# Patient Record
Sex: MALE | Race: WHITE | Employment: UNEMPLOYED | URBAN - METROPOLITAN AREA
[De-identification: names, ages, dates, MRNs, and addresses within clinical notes are randomized per-mention and may not be internally consistent; named-entity substitution may affect disease eponyms.]

---

## 2020-07-22 ENCOUNTER — APPOINTMENT (OUTPATIENT)
Dept: GENERAL RADIOLOGY | Age: 20
End: 2020-07-22
Payer: COMMERCIAL

## 2020-07-22 ENCOUNTER — HOSPITAL ENCOUNTER (EMERGENCY)
Age: 20
Discharge: HOME | End: 2020-07-22
Payer: COMMERCIAL

## 2020-07-22 VITALS
HEIGHT: 65 IN | TEMPERATURE: 98.1 F | OXYGEN SATURATION: 100 % | BODY MASS INDEX: 21.16 KG/M2 | HEART RATE: 71 BPM | RESPIRATION RATE: 18 BRPM | DIASTOLIC BLOOD PRESSURE: 96 MMHG | SYSTOLIC BLOOD PRESSURE: 132 MMHG | WEIGHT: 127 LBS

## 2020-07-22 LAB — STREP GRP A PCR: NEGATIVE

## 2020-07-22 PROCEDURE — 99285 EMERGENCY DEPT VISIT HI MDM: CPT

## 2020-07-22 PROCEDURE — 6370000000 HC RX 637 (ALT 250 FOR IP): Performed by: NURSE PRACTITIONER

## 2020-07-22 PROCEDURE — 71046 X-RAY EXAM CHEST 2 VIEWS: CPT

## 2020-07-22 PROCEDURE — 87651 STREP A DNA AMP PROBE: CPT

## 2020-07-22 RX ORDER — IBUPROFEN 800 MG/1
800 TABLET ORAL ONCE
Status: COMPLETED | OUTPATIENT
Start: 2020-07-22 | End: 2020-07-22

## 2020-07-22 RX ORDER — NAPROXEN 500 MG/1
500 TABLET ORAL 2 TIMES DAILY
Qty: 20 TABLET | Refills: 0 | Status: SHIPPED | OUTPATIENT
Start: 2020-07-22 | End: 2022-03-29

## 2020-07-22 RX ORDER — CYCLOBENZAPRINE HCL 10 MG
10 TABLET ORAL 3 TIMES DAILY PRN
Qty: 15 TABLET | Refills: 0 | Status: SHIPPED | OUTPATIENT
Start: 2020-07-22 | End: 2020-08-01

## 2020-07-22 RX ADMIN — IBUPROFEN 800 MG: 800 TABLET, FILM COATED ORAL at 11:01

## 2020-07-22 ASSESSMENT — ENCOUNTER SYMPTOMS
TROUBLE SWALLOWING: 0
NAUSEA: 0
DIARRHEA: 0
BACK PAIN: 0
SORE THROAT: 0
CHEST TIGHTNESS: 0
SHORTNESS OF BREATH: 0
COUGH: 0
VOMITING: 0
WHEEZING: 0
ABDOMINAL PAIN: 0

## 2020-07-22 ASSESSMENT — PAIN SCALES - GENERAL: PAINLEVEL_OUTOF10: 4

## 2020-07-22 ASSESSMENT — PAIN DESCRIPTION - LOCATION: LOCATION: THROAT

## 2020-07-22 ASSESSMENT — PAIN DESCRIPTION - FREQUENCY: FREQUENCY: INTERMITTENT

## 2020-07-22 ASSESSMENT — PAIN DESCRIPTION - DESCRIPTORS: DESCRIPTORS: SORE

## 2020-07-22 NOTE — ED TRIAGE NOTES
Pt is a+o x4 msps intact lungs clear bilat skin warm pink and dry. Pt states he was working and moving trays of dirt then had intermittent chest pain. Pt states he no longer has pain but states he started getting a sore throat. Pt states this all started 1 hr ago. Pt speaking in full sentences. Appears in no distress. Skin warm pink and dry. Pt denies any fevers chills or coughs.  Pt stated while getting triage he has no pain in chest but states he has had a \"little bit of sore throat sometimes\"

## 2020-07-22 NOTE — ED PROVIDER NOTES
[facility] [facility] ED  eMERGENCY dEPARTMENT eNCOUnter      Pt Name: Tc Keller  MRN: [id number]  [place] 2000  Date of evaluation: 7/22/2020  Provider: MANUEL Kan CNP      HISTORY OF PRESENT ILLNESS    Tc Keller is a 22 y.o. male who presents to the Emergency Department with chest wall pain that radiates into the neck that started 1.5 hrs ago after lifting dirt. Pain is worse with movement and is reproducible. He denies fever,chills, SOB or cough. Pain is moderate. REVIEW OF SYSTEMS       Review of Systems   Constitutional: Negative for activity change, appetite change and fever. HENT: Negative for congestion, drooling, ear pain, sore throat and trouble swallowing. Respiratory: Negative for cough, chest tightness, shortness of breath and wheezing. Cardiovascular: Negative for chest pain and palpitations. Gastrointestinal: Negative for abdominal pain, diarrhea, nausea and vomiting. Genitourinary: Negative for dysuria. Musculoskeletal: Negative for arthralgias and back pain. Chest wall pain   Skin: Negative for rash. All other systems reviewed and are negative. PAST MEDICAL HISTORY   History reviewed. No pertinent past medical history. SURGICAL HISTORY     History reviewed. No pertinent surgical history. CURRENT MEDICATIONS       Previous Medications    No medications on file       ALLERGIES     Patient has no known allergies. FAMILY HISTORY     History reviewed. No pertinent family history.        SOCIAL HISTORY       Social History     Socioeconomic History    Marital status: Single     Spouse name: None    Number of children: None    Years of education: None    Highest education level: None   Occupational History    None   Social Needs    Financial resource strain: None    Food insecurity     Worry: None     Inability: None    Transportation needs     Medical: None     Non-medical: None   Tobacco Use    Smoking status: Current Every Day Smoker     Types: Cigarettes    Smokeless tobacco: Never Used   Substance and Sexual Activity    Alcohol use: None     Comment: social    Drug use: Yes     Types: Marijuana    Sexual activity: None   Lifestyle    Physical activity     Days per week: None     Minutes per session: None    Stress: None   Relationships    Social connections     Talks on phone: None     Gets together: None     Attends Zoroastrian service: None     Active member of club or organization: None     Attends meetings of clubs or organizations: None     Relationship status: None    Intimate partner violence     Fear of current or ex partner: None     Emotionally abused: None     Physically abused: None     Forced sexual activity: None   Other Topics Concern    None   Social History Narrative    None       SCREENINGS      @FLOW([id number])@      PHYSICAL EXAM    (up to 7 for level 4, 8 or more for level 5)     ED Triage Vitals [07/22/20 1019]   BP Temp Temp Source Pulse Resp SpO2 Height Weight   (!) 132/96 98.1 °F (36.7 °C) Oral 71 18 100 % 5' 5\" (1.651 m) 127 lb (57.6 kg)       Physical Exam  Vitals signs and nursing note reviewed. Constitutional:       Appearance: He is well-developed. HENT:      Head: Normocephalic and atraumatic. Right Ear: Hearing, tympanic membrane, ear canal and external ear normal.      Left Ear: Hearing, tympanic membrane, ear canal and external ear normal.      Nose: Nose normal.      Mouth/Throat:      Lips: Pink. Mouth: Mucous membranes are moist.      Pharynx: Oropharynx is clear. Uvula midline. Eyes:      Conjunctiva/sclera: Conjunctivae normal.      Pupils: Pupils are equal, round, and reactive to light. Neck:      Musculoskeletal: Normal range of motion and neck supple. Cardiovascular:      Rate and Rhythm: Normal rate and regular rhythm. Heart sounds: Normal heart sounds.    Pulmonary:      Effort: Pulmonary effort is normal. No accessory muscle usage or respiratory distress. Breath sounds: Normal breath sounds. No decreased air movement. No decreased breath sounds or wheezing. Chest:      Chest wall: Tenderness present. No deformity, swelling or edema. Abdominal:      General: Bowel sounds are normal. There is no distension. Palpations: Abdomen is soft. Tenderness: There is no abdominal tenderness. Musculoskeletal: Normal range of motion. Skin:     General: Skin is warm and dry. Neurological:      Mental Status: He is alert and oriented to person, place, and time. Deep Tendon Reflexes: Reflexes are normal and symmetric. Psychiatric:         Judgment: Judgment normal.           All other labs were within normal range or not returned as of this dictation. EMERGENCY DEPARTMENT COURSE and DIFFERENTIALDIAGNOSIS/MDM:   Vitals:    Vitals:    07/22/20 1019   BP: (!) 132/96   Pulse: 71   Resp: 18   Temp: 98.1 °F (36.7 °C)   TempSrc: Oral   SpO2: 100%   Weight: 127 lb (57.6 kg)   Height: 5' 5\" (1.651 m)            22 yr old male with chest wall strain. Prescriptions for Naprosyn and Flexeril were given to the patient. F/U With PCP in 3 days. Patient verbalizes understanding. PROCEDURES:  Unless otherwise noted below, none     Procedures      FINAL IMPRESSION      1.  Chest wall pain          DISPOSITION/PLAN   DISPOSITION Decision To Discharge 07/22/2020 11:23:57 AM          MANUEL Delarosa CNP (electronically signed)  Attending Emergency Physician     MANUEL Delarosa CNP  07/22/20 0624

## 2022-03-29 ENCOUNTER — HOSPITAL ENCOUNTER (INPATIENT)
Age: 22
LOS: 6 days | Discharge: HOME | DRG: 751 | End: 2022-04-04
Attending: EMERGENCY MEDICINE | Admitting: PSYCHIATRY & NEUROLOGY
Payer: COMMERCIAL

## 2022-03-29 DIAGNOSIS — F32.9 MAJOR DEPRESSIVE DISORDER WITH CURRENT ACTIVE EPISODE, UNSPECIFIED DEPRESSION EPISODE SEVERITY, UNSPECIFIED WHETHER RECURRENT: Primary | ICD-10-CM

## 2022-03-29 PROBLEM — F33.9 MAJOR DEPRESSION, RECURRENT (HCC): Status: ACTIVE | Noted: 2022-03-29

## 2022-03-29 LAB
ACETAMINOPHEN LEVEL: <5 UG/ML (ref 10–30)
ALBUMIN SERPL-MCNC: 5.1 G/DL (ref 3.5–4.6)
ALP BLD-CCNC: 75 U/L (ref 35–104)
ALT SERPL-CCNC: 15 U/L (ref 0–41)
AMPHETAMINE SCREEN, URINE: ABNORMAL
ANION GAP SERPL CALCULATED.3IONS-SCNC: 12 MEQ/L (ref 9–15)
AST SERPL-CCNC: 16 U/L (ref 0–40)
BACTERIA: NEGATIVE /HPF
BARBITURATE SCREEN URINE: ABNORMAL
BASOPHILS ABSOLUTE: 0 K/UL (ref 0–0.2)
BASOPHILS RELATIVE PERCENT: 0.3 %
BENZODIAZEPINE SCREEN, URINE: ABNORMAL
BILIRUB SERPL-MCNC: 0.4 MG/DL (ref 0.2–0.7)
BILIRUBIN URINE: NEGATIVE
BLOOD, URINE: NEGATIVE
BUN BLDV-MCNC: 12 MG/DL (ref 6–20)
CALCIUM SERPL-MCNC: 9.3 MG/DL (ref 8.5–9.9)
CANNABINOID SCREEN URINE: POSITIVE
CHLORIDE BLD-SCNC: 105 MEQ/L (ref 95–107)
CHOLESTEROL, TOTAL: 92 MG/DL (ref 0–199)
CLARITY: CLEAR
CO2: 23 MEQ/L (ref 20–31)
COCAINE METABOLITE SCREEN URINE: ABNORMAL
COLOR: YELLOW
CREAT SERPL-MCNC: 1.03 MG/DL (ref 0.7–1.2)
EOSINOPHILS ABSOLUTE: 0 K/UL (ref 0–0.7)
EOSINOPHILS RELATIVE PERCENT: 0.9 %
EPITHELIAL CELLS, UA: NORMAL /HPF (ref 0–5)
ETHANOL PERCENT: NORMAL G/DL
ETHANOL: <10 MG/DL (ref 0–0.08)
GFR AFRICAN AMERICAN: >60
GFR NON-AFRICAN AMERICAN: >60
GLOBULIN: 2.4 G/DL (ref 2.3–3.5)
GLUCOSE BLD-MCNC: 122 MG/DL (ref 70–99)
GLUCOSE URINE: 100 MG/DL
HCT VFR BLD CALC: 48.7 % (ref 42–52)
HDLC SERPL-MCNC: 36 MG/DL (ref 40–59)
HEMOGLOBIN: 16 G/DL (ref 14–18)
HYALINE CASTS: NORMAL /HPF (ref 0–5)
KETONES, URINE: NEGATIVE MG/DL
LDL CHOLESTEROL CALCULATED: 41 MG/DL (ref 0–129)
LEUKOCYTE ESTERASE, URINE: NEGATIVE
LYMPHOCYTES ABSOLUTE: 1.2 K/UL (ref 1–4.8)
LYMPHOCYTES RELATIVE PERCENT: 24.8 %
Lab: ABNORMAL
MCH RBC QN AUTO: 28.3 PG (ref 27–31.3)
MCHC RBC AUTO-ENTMCNC: 32.8 % (ref 33–37)
MCV RBC AUTO: 86.1 FL (ref 80–100)
METHADONE SCREEN, URINE: ABNORMAL
MONOCYTES ABSOLUTE: 0.4 K/UL (ref 0.2–0.8)
MONOCYTES RELATIVE PERCENT: 9.1 %
NEUTROPHILS ABSOLUTE: 3.2 K/UL (ref 1.4–6.5)
NEUTROPHILS RELATIVE PERCENT: 64.9 %
NITRITE, URINE: NEGATIVE
OPIATE SCREEN URINE: ABNORMAL
OXYCODONE URINE: ABNORMAL
PDW BLD-RTO: 13.5 % (ref 11.5–14.5)
PH UA: 5.5 (ref 5–9)
PHENCYCLIDINE SCREEN URINE: ABNORMAL
PLATELET # BLD: 128 K/UL (ref 130–400)
POTASSIUM SERPL-SCNC: 3.9 MEQ/L (ref 3.4–4.9)
PROPOXYPHENE SCREEN: ABNORMAL
PROTEIN UA: 100 MG/DL
RBC # BLD: 5.65 M/UL (ref 4.7–6.1)
RBC UA: NORMAL /HPF (ref 0–5)
SALICYLATE, SERUM: <0.3 MG/DL (ref 15–30)
SARS-COV-2, NAAT: NOT DETECTED
SODIUM BLD-SCNC: 140 MEQ/L (ref 135–144)
SPECIFIC GRAVITY UA: 1.01 (ref 1–1.03)
TOTAL CK: 88 U/L (ref 0–190)
TOTAL PROTEIN: 7.5 G/DL (ref 6.3–8)
TRIGL SERPL-MCNC: 74 MG/DL (ref 0–150)
TSH SERPL DL<=0.05 MIU/L-ACNC: 2.12 UIU/ML (ref 0.44–3.86)
URINE REFLEX TO CULTURE: ABNORMAL
UROBILINOGEN, URINE: 0.2 E.U./DL
VALPROIC ACID LEVEL: <2.8 UG/ML (ref 50–100)
WBC # BLD: 4.9 K/UL (ref 4.8–10.8)
WBC UA: NORMAL /HPF (ref 0–5)

## 2022-03-29 PROCEDURE — 82077 ASSAY SPEC XCP UR&BREATH IA: CPT

## 2022-03-29 PROCEDURE — 87635 SARS-COV-2 COVID-19 AMP PRB: CPT

## 2022-03-29 PROCEDURE — 1240000000 HC EMOTIONAL WELLNESS R&B

## 2022-03-29 PROCEDURE — 81001 URINALYSIS AUTO W/SCOPE: CPT

## 2022-03-29 PROCEDURE — 85025 COMPLETE CBC W/AUTO DIFF WBC: CPT

## 2022-03-29 PROCEDURE — 84443 ASSAY THYROID STIM HORMONE: CPT

## 2022-03-29 PROCEDURE — 80061 LIPID PANEL: CPT

## 2022-03-29 PROCEDURE — 80053 COMPREHEN METABOLIC PANEL: CPT

## 2022-03-29 PROCEDURE — 99284 EMERGENCY DEPT VISIT MOD MDM: CPT

## 2022-03-29 PROCEDURE — 6370000000 HC RX 637 (ALT 250 FOR IP): Performed by: PSYCHIATRY & NEUROLOGY

## 2022-03-29 PROCEDURE — 36415 COLL VENOUS BLD VENIPUNCTURE: CPT

## 2022-03-29 PROCEDURE — 80143 DRUG ASSAY ACETAMINOPHEN: CPT

## 2022-03-29 PROCEDURE — 80307 DRUG TEST PRSMV CHEM ANLYZR: CPT

## 2022-03-29 PROCEDURE — 80179 DRUG ASSAY SALICYLATE: CPT

## 2022-03-29 PROCEDURE — 80164 ASSAY DIPROPYLACETIC ACD TOT: CPT

## 2022-03-29 PROCEDURE — 82550 ASSAY OF CK (CPK): CPT

## 2022-03-29 RX ORDER — HALOPERIDOL 5 MG/ML
5 INJECTION INTRAMUSCULAR EVERY 6 HOURS PRN
Status: DISCONTINUED | OUTPATIENT
Start: 2022-03-29 | End: 2022-04-04 | Stop reason: HOSPADM

## 2022-03-29 RX ORDER — HYDROXYZINE HYDROCHLORIDE 50 MG/ML
50 INJECTION, SOLUTION INTRAMUSCULAR EVERY 6 HOURS PRN
Status: DISCONTINUED | OUTPATIENT
Start: 2022-03-29 | End: 2022-04-04 | Stop reason: HOSPADM

## 2022-03-29 RX ORDER — MAGNESIUM HYDROXIDE/ALUMINUM HYDROXICE/SIMETHICONE 120; 1200; 1200 MG/30ML; MG/30ML; MG/30ML
30 SUSPENSION ORAL PRN
Status: DISCONTINUED | OUTPATIENT
Start: 2022-03-29 | End: 2022-04-04 | Stop reason: HOSPADM

## 2022-03-29 RX ORDER — BENZTROPINE MESYLATE 1 MG/ML
2 INJECTION INTRAMUSCULAR; INTRAVENOUS 2 TIMES DAILY PRN
Status: DISCONTINUED | OUTPATIENT
Start: 2022-03-29 | End: 2022-04-04 | Stop reason: HOSPADM

## 2022-03-29 RX ORDER — HALOPERIDOL 5 MG
5 TABLET ORAL EVERY 6 HOURS PRN
Status: DISCONTINUED | OUTPATIENT
Start: 2022-03-29 | End: 2022-04-04 | Stop reason: HOSPADM

## 2022-03-29 RX ORDER — TRAZODONE HYDROCHLORIDE 50 MG/1
50 TABLET ORAL NIGHTLY PRN
Status: DISCONTINUED | OUTPATIENT
Start: 2022-03-29 | End: 2022-04-04

## 2022-03-29 RX ORDER — ACETAMINOPHEN 325 MG/1
650 TABLET ORAL EVERY 4 HOURS PRN
Status: DISCONTINUED | OUTPATIENT
Start: 2022-03-29 | End: 2022-04-04 | Stop reason: HOSPADM

## 2022-03-29 RX ORDER — HYDROXYZINE PAMOATE 50 MG/1
50 CAPSULE ORAL EVERY 6 HOURS PRN
Status: DISCONTINUED | OUTPATIENT
Start: 2022-03-29 | End: 2022-04-04 | Stop reason: HOSPADM

## 2022-03-29 RX ADMIN — TRAZODONE HYDROCHLORIDE 50 MG: 50 TABLET ORAL at 21:53

## 2022-03-29 ASSESSMENT — SLEEP AND FATIGUE QUESTIONNAIRES
SLEEP PATTERN: DIFFICULTY FALLING ASLEEP;DISTURBED/INTERRUPTED SLEEP;RESTLESSNESS
DIFFICULTY FALLING ASLEEP: YES
DO YOU HAVE DIFFICULTY SLEEPING: YES
DIFFICULTY ARISING: NO
DO YOU USE A SLEEP AID: YES
RESTFUL SLEEP: NO
DIFFICULTY STAYING ASLEEP: YES

## 2022-03-29 ASSESSMENT — PATIENT HEALTH QUESTIONNAIRE - PHQ9: SUM OF ALL RESPONSES TO PHQ QUESTIONS 1-9: 19

## 2022-03-29 NOTE — ED NOTES
Patient admitted to [facility] bed 5. Oriented to unit and changed into psych safe clothing. Patient offered and refused anything to eat or drink at this time.       Suman Spicer RN  03/29/22 1392

## 2022-03-29 NOTE — ED PROVIDER NOTES
[address] [facility] ED  EMERGENCY DEPARTMENT ENCOUNTER      Pt Name: Jurgen Bryant  MRN: [id number]  [place] 2000  Date of evaluation: 3/29/2022  Provider: Mariann Park MD    CHIEF COMPLAINT       Chief Complaint   Patient presents with    Suicidal         HISTORY OF PRESENT ILLNESS   (Location/Symptom, Timing/Onset, Context/Setting, Quality, Duration, Modifying Factors, Severity)  Note limiting factors. 78-year-old male presenting with suicidal ideation. Presents here because he does not feel safe at home. He felt like someone needed to watch him because \"he did not trust himself to be alone. \"  No hx of psychiatric issues, not on any meds. No psychiatric hospitalizations in the past.  Denies any homicidal ideation or hallucinations. Notes no inciting events. Nursing Notes were reviewed. REVIEW OF SYSTEMS    (2-9 systems for level 4, 10 or more for level 5)     Review of Systems   Unable to perform ROS: Psychiatric disorder   All other systems reviewed and are negative. Except as noted above the remainder of the review of systems was reviewed and negative. PAST MEDICAL HISTORY   History reviewed. No pertinent past medical history. SURGICAL HISTORY     History reviewed. No pertinent surgical history. CURRENT MEDICATIONS       Previous Medications    No medications on file       ALLERGIES     Patient has no known allergies. FAMILY HISTORY     History reviewed. No pertinent family history.        SOCIAL HISTORY       Social History     Socioeconomic History    Marital status: Single     Spouse name: None    Number of children: None    Years of education: None    Highest education level: None   Occupational History    None   Tobacco Use    Smoking status: Current Every Day Smoker     Types: Cigarettes    Smokeless tobacco: Never Used   Vaping Use    Vaping Use: Never used   Substance and Sexual Activity    Alcohol use: None     Comment: social    Drug use: Yes     Types: Marijuana Bromide Miller)    Sexual activity: None   Other Topics Concern    None   Social History Narrative    None     Social Determinants of Health     Financial Resource Strain:     Difficulty of Paying Living Expenses: Not on file   Food Insecurity:     Worried About Running Out of Food in the Last Year: Not on file    Adrian of Food in the Last Year: Not on file   Transportation Needs:     Lack of Transportation (Medical): Not on file    Lack of Transportation (Non-Medical): Not on file   Physical Activity:     Days of Exercise per Week: Not on file    Minutes of Exercise per Session: Not on file   Stress:     Feeling of Stress : Not on file   Social Connections:     Frequency of Communication with Friends and Family: Not on file    Frequency of Social Gatherings with Friends and Family: Not on file    Attends Sabianism Services: Not on file    Active Member of [address] [place] [address] [employer] or Organizations: Not on file    Attends Club or Organization Meetings: Not on file    Marital Status: Not on file   Intimate Partner Violence:     Fear of Current or Ex-Partner: Not on file    Emotionally Abused: Not on file    Physically Abused: Not on file    Sexually Abused: Not on file   Housing Stability:     Unable to Pay for Housing in the Last Year: Not on file    Number of [place] in the Last Year: Not on file    Unstable Housing in the Last Year: Not on file       SCREENINGS    [place] Coma Scale  Eye Opening: Spontaneous  Best Verbal Response: Oriented  Best Motor Response: Obeys commands  [place] Coma Scale Score: 15          PHYSICAL EXAM    (up to 7 for level 4, 8 or more for level 5)     ED Triage Vitals [03/29/22 1719]   BP Temp Temp Source Pulse Resp SpO2 Height Weight   (!) 181/99 99 °F (37.2 °C) Oral 83 16 97 % 5' 6\" (1.676 m) 130 lb (59 kg)       Physical Exam  Vitals and nursing note reviewed. Constitutional:       General: He is not in acute distress. Appearance: Normal appearance. He is well-developed. He is not ill-appearing. HENT:      Head: Normocephalic and atraumatic. Mouth/Throat:      Mouth: Mucous membranes are moist.      Pharynx: Oropharynx is clear. Eyes:      Extraocular Movements: Extraocular movements intact. Conjunctiva/sclera: Conjunctivae normal.   Cardiovascular:      Rate and Rhythm: Normal rate and regular rhythm. Pulmonary:      Effort: Pulmonary effort is normal.      Breath sounds: Normal breath sounds. Abdominal:      General: Bowel sounds are normal.      Palpations: Abdomen is soft. Tenderness: There is no abdominal tenderness. Musculoskeletal:         General: No deformity. Normal range of motion. Cervical back: Normal range of motion and neck supple. Skin:     General: Skin is warm and dry. Capillary Refill: Capillary refill takes less than 2 seconds. Neurological:      General: No focal deficit present. Mental Status: He is alert and oriented to person, place, and time. Mental status is at baseline. Cranial Nerves: No cranial nerve deficit.    Psychiatric:      Comments: Poor judgement         DIAGNOSTIC RESULTS     EKG: All EKG's are interpreted by the Emergency Department Physician who either signs or Co-signs this chart in the absence of a cardiologist.    RADIOLOGY:   Non-plain film images such as CT, Ultrasound and MRI are read by the radiologist. Plain radiographic images are visualized and preliminarily interpreted by the emergency physician with the below findings:    Interpretation per the Radiologist below, if available at the time of this note:    No orders to display       LABS:  Labs Reviewed   ACETAMINOPHEN LEVEL - Abnormal; Notable for the following components:       Result Value    Acetaminophen Level <5 (*)     All other components within normal limits   CBC WITH AUTO DIFFERENTIAL - Abnormal; Notable for the following components:    MCHC 32.8 (*)     Platelets 684 (*)     All other components within normal limits   COMPREHENSIVE METABOLIC PANEL - Abnormal; Notable for the following components:    Glucose 122 (*)     Albumin 5.1 (*)     All other components within normal limits   LIPID PANEL - Abnormal; Notable for the following components:    HDL 36 (*)     All other components within normal limits   SALICYLATE LEVEL - Abnormal; Notable for the following components:    Salicylate, Serum <7.7 (*)     All other components within normal limits   URINALYSIS WITH REFLEX TO CULTURE - Abnormal; Notable for the following components:    Glucose, Ur 100 (*)     Protein,  (*)     All other components within normal limits   URINE DRUG SCREEN - Abnormal; Notable for the following components:    Cannabinoid Scrn, Ur POSITIVE (*)     All other components within normal limits   VALPROIC ACID LEVEL, TOTAL - Abnormal; Notable for the following components:    Valproic Acid Lvl <2.8 (*)     All other components within normal limits   COVID-19, RAPID   CK   ETHANOL   TSH   MICROSCOPIC URINALYSIS       All other labs were within normal range or not returned as of this dictation. EMERGENCY DEPARTMENT COURSE and DIFFERENTIAL DIAGNOSIS/MDM:   Vitals:    Vitals:    03/29/22 1719 03/29/22 1846   BP: (!) 181/99 (!) 145/81   Pulse: 83 76   Resp: 16 16   Temp: 99 °F (37.2 °C) 98.4 °F (36.9 °C)   TempSrc: Oral Oral   SpO2: 97% 98%   Weight: 130 lb (59 kg)    Height: 5' 6\" (1.676 m)        MDM  Number of Diagnoses or Management Options  Major depressive disorder with current active episode, unspecified depression episode severity, unspecified whether recurrent  Diagnosis management comments: Medically cleared for  eval.  Admitted to , major depression. Procedures    CRITICAL CARE TIME   Total Critical Care time was 0 minutes, excluding separately reportable procedures. There was a high probability of clinically significant/life threatening deterioration in the patient's condition which required my urgent intervention.

## 2022-03-29 NOTE — ED TRIAGE NOTES
Pt to the ED via walk into triage with continued depression and SI.   Pt states that he would plan to hang or cut himself

## 2022-03-29 NOTE — ED NOTES
Chart to zone 2. [employer] notified of patient belongings.       Jhonathan Hanson, DARIN  03/29/22 5114

## 2022-03-29 NOTE — ED NOTES
Provisional Diagnosis:    Major Depression     Psychosocial and Contextual Factors:    Grandmother passed away 5 years ago  Grandfather passed away 1 year ago  Lived with a friend for awhile but has since moved back in with his parents  Doesn't feel like he has any support system  Stopped seeing Priscilla Mitchell at [facility] dentistry    C-SSRS Summary:     Patient: C-SSRS Suicide Screening  1) Within the past month, have you wished you were dead or wished you could go to sleep and not wake up? : Yes  2) Have you actually had any thoughts of killing yourself? : Yes  3) Have you been thinking about how you might kill yourself? : Yes  4) Have you had these thoughts and had some intention of acting on them? : Yes  5) Have you started to work out or worked out the details of how to kill yourself? Do you intend to carry out this plan? : No  6) Have you ever done anything, started to do anything, or prepared to do anything to end your life?: No  Risk of Suicide: High Risk    Family: None at bedside    Agency: [address] [place] [address]          Abuse Assessment  Physical Abuse: Denies  Verbal Abuse: Denies  Emotional abuse: Denies  Financial Abuse: Denies  Sexual abuse: Denies  Elder abuse: No    Clinical Summary:    Patient came in with complaints of worsening depression and SI. Patient stated that the depression started 5 years ago with his grandmothers death. This coincided with his first thoughts of SI. Patient then lost his grandfather 4 years later, which has made his depression worse and SI intensify. Within the last couple days, he has been having SI thoughts that are intensified, no plan. He denies any previous inpatient psych admissions. He denies being on any psych medications in the past.  Does say that he was taking something for sleep. Denies any drug or alcohol use outside of marijuana which he says helps him think. He is appropriate with eye contact. Denies HI, AVH.       Level of Care Disposition: Per Dr Yary Barth, admit to 3W under major depression with standard PRN orders               Lázaro Blackwood RN  03/29/22 3499

## 2022-03-30 PROCEDURE — 6370000000 HC RX 637 (ALT 250 FOR IP): Performed by: NURSE PRACTITIONER

## 2022-03-30 PROCEDURE — 6370000000 HC RX 637 (ALT 250 FOR IP): Performed by: PSYCHIATRY & NEUROLOGY

## 2022-03-30 PROCEDURE — 93005 ELECTROCARDIOGRAM TRACING: CPT | Performed by: PSYCHIATRY & NEUROLOGY

## 2022-03-30 PROCEDURE — 99223 1ST HOSP IP/OBS HIGH 75: CPT | Performed by: PSYCHIATRY & NEUROLOGY

## 2022-03-30 PROCEDURE — 1240000000 HC EMOTIONAL WELLNESS R&B

## 2022-03-30 RX ORDER — LISINOPRIL 5 MG/1
5 TABLET ORAL DAILY
Status: DISCONTINUED | OUTPATIENT
Start: 2022-03-30 | End: 2022-04-04 | Stop reason: HOSPADM

## 2022-03-30 RX ORDER — MIRTAZAPINE 15 MG/1
15 TABLET, FILM COATED ORAL NIGHTLY
Status: DISCONTINUED | OUTPATIENT
Start: 2022-03-30 | End: 2022-04-04 | Stop reason: HOSPADM

## 2022-03-30 RX ORDER — MIRTAZAPINE 15 MG/1
15 TABLET, FILM COATED ORAL DAILY
Status: ON HOLD | COMMUNITY
End: 2022-04-04 | Stop reason: HOSPADM

## 2022-03-30 RX ORDER — ERGOCALCIFEROL 1.25 MG/1
50000 CAPSULE ORAL WEEKLY
COMMUNITY

## 2022-03-30 RX ADMIN — LISINOPRIL 5 MG: 5 TABLET ORAL at 10:46

## 2022-03-30 RX ADMIN — HYDROXYZINE PAMOATE 50 MG: 50 CAPSULE ORAL at 00:48

## 2022-03-30 RX ADMIN — MIRTAZAPINE 15 MG: 15 TABLET, FILM COATED ORAL at 21:06

## 2022-03-30 RX ADMIN — TRAZODONE HYDROCHLORIDE 50 MG: 50 TABLET ORAL at 21:06

## 2022-03-30 ASSESSMENT — LIFESTYLE VARIABLES: HISTORY_ALCOHOL_USE: NO

## 2022-03-30 NOTE — PROGRESS NOTES
Pt out on unit,social with peers. Pt voiced goal, adapt to this environment, be social. Pt acknowledges, admit to [facility] [place], voiced, medication regime, positive effect. Pt reports showering today. Pt reports good appetite. Pt reports poor sleep, due to trouble falling asleep and staying asleep, voiced Trazodone not effective, last night. Pt encouraged to participate in positive coping skills, before HS, Pt verbalized understanding. Pt rates anxiety 6/10. Pt rates depression 0/10. On a scale from 1 through 10, 10 being the highest. Pt denies SI, HI and A/V hallucinations.

## 2022-03-30 NOTE — GROUP NOTE
Group Therapy Note    Date: 3/30/2022    Group Start Time: 5380  Group End Time: 1700  Group Topic: Healthy Living/Wellness    [facility] [place]        Group Therapy Note    Attendees: 14/21         Patient's Goal:  To learn how coping skills affect mood. Notes:  Patient participated in group with peers.     Status After Intervention:  Unchanged    Participation Level: Interactive    Participation Quality: Appropriate and Attentive      Speech:  normal      Thought Process/Content: Logical      Affective Functioning: Congruent      Mood: euthymic      Level of consciousness:  Alert and Attentive      Response to Learning: Progressing to goal      Endings: None Reported    Modes of Intervention: Education      Discipline Responsible: [address], [employer]      Signature:  Sowmya Gallegos

## 2022-03-30 NOTE — PROGRESS NOTES
Pt agreeable to complete admission. Consents signed. Unit paperwork with hipaa code and Pt Rights given with explanation. Pt stated,''Jennifer been getting depressed,i wanted to kill myself. Negative thoughts. I cant keep my mind from negative thoughts. I was doing well until my grandparents . My grandpa passed away last year,and my grandma passed away 5yrs ago. Im not the same since my grandparents passed. I don't feel like doing anything. I use to work at [employer] last year,thou I quit, I was there over a year. I hate being around people,they make me nervous. I live with my parents and they are supportive. I don't drive,or have my drivers license. Im afraid to drive. I have issues with my memory, cannot focus or concentrate.''  Pt denies current SI. Contracts for safety on the unit. Pt reports SA by attempting to strangle self with a cord,thou self aborted (occured 4-5yrs ago). Pt reports attempt to cut self with a razor 1yr ago,thou mom intervened. Reports has been seeing a [place] at [facility] and dentistry. Pt reports he is suppose to see her weekly on ,last saw her 2wks ago. Reports takes trazodone for sleep,and does report poor sleep. [facility] dosage. Reports last took yesterday. Pt reports he worked last year at [employer]  quit. Reports was having difficulty being around people. Pt reports people make him feel ''nervous''. Pt denies HI, A/V hallucinations. Pt feels hopeless,helpless, anhedonia,low self esteem. Reports appetite varies,thou denies weight loss. Declined snack.

## 2022-03-30 NOTE — CONSULTS
[facility]  MEDICINE    HISTORY AND PHYSICAL EXAM    PATIENT NAME:  Nahomi Cox    MRN:  [id number]  SERVICE DATE:  3/30/2022   SERVICE TIME:  8:00 AM    Primary Care Physician: No primary care provider on file. SUBJECTIVE  CHIEF COMPLAINT:  Medically appropriate for inpatient psychiatry admission. Consult for medical H/P encounter. HPI:  This is a 19 y.o. male who presents with no PMHx  presented to emergency room with worsening depression and SI with no specific plan. Reports SI have intensified over the past several days but has been depressed for five years. Patient cleared from emergency room for psychiatric care. Patient Seen, Chart, Labs, Radiologystudies, and Consults reviewed. Patient denies headache, chest pain, shortness of breath, N/V/D/C, fever/chills. PAST MEDICAL HISTORY:  History reviewed. No pertinent past medical history. PAST SURGICAL HISTORY:  History reviewed. No pertinent surgical history. FAMILY HISTORY:  History reviewed. No pertinent family history.   SOCIAL HISTORY:    Social History     Socioeconomic History    Marital status: Single     Spouse name: Not on file    Number of children: Not on file    Years of education: Not on file    Highest education level: Not on file   Occupational History    Not on file   Tobacco Use    Smoking status: Current Every Day Smoker     Types: Cigarettes    Smokeless tobacco: Never Used   Vaping Use    Vaping Use: Never used   Substance and Sexual Activity    Alcohol use: Not on file     Comment: social    Drug use: Yes     Types: Marijuana Elfida Jarad)    Sexual activity: Not on file   Other Topics Concern    Not on file   Social History Narrative    Not on file     Social Determinants of Health     Financial Resource Strain:     Difficulty of Paying Living Expenses: Not on file   Food Insecurity:     Worried About Running Out of Food in the Last Year: Not on file    Adrian of Food in the Last Year: Not on file Transportation Needs:     Lack of Transportation (Medical): Not on file    Lack of Transportation (Non-Medical):  Not on file   Physical Activity:     Days of Exercise per Week: Not on file    Minutes of Exercise per Session: Not on file   Stress:     Feeling of Stress : Not on file   Social Connections:     Frequency of Communication with Friends and Family: Not on file    Frequency of Social Gatherings with Friends and Family: Not on file    Attends Orthodoxy Services: Not on file    Active Member of Clubs or Organizations: Not on file    Attends Club or Organization Meetings: Not on file    Marital Status: Not on file   Intimate Partner Violence:     Fear of Current or Ex-Partner: Not on file    Emotionally Abused: Not on file    Physically Abused: Not on file    Sexually Abused: Not on file   Housing Stability:     Unable to Pay for Housing in the Last Year: Not on file    Number of [place] in the Last Year: Not on file    Unstable Housing in the Last Year: Not on file     MEDICATIONS:    Current Facility-Administered Medications   Medication Dose Route Frequency Provider Last Rate Last Admin    acetaminophen (TYLENOL) tablet 650 mg  650 mg Oral Q4H PRN Macarena Iglesias MD        magnesium hydroxide (MILK OF MAGNESIA) 400 MG/5ML suspension 30 mL  30 mL Oral Daily PRN Macarena Iglesias MD        aluminum & magnesium hydroxide-simethicone (MAALOX) 200-200-20 MG/5ML suspension 30 mL  30 mL Oral PRN Macarena Iglesias MD        haloperidol (HALDOL) tablet 5 mg  5 mg Oral Q6H PRN Macarena Iglesias MD        Or    haloperidol lactate (HALDOL) injection 5 mg  5 mg IntraMUSCular Q6H PRN Macarena Iglesias MD        benztropine mesylate (COGENTIN) injection 2 mg  2 mg IntraMUSCular BID PRN Macarena Iglesias MD        traZODone (DESYREL) tablet 50 mg  50 mg Oral Nightly PRN Macarena Iglesias MD   50 mg at 03/29/22 2153    hydrOXYzine (VISTARIL) injection 50 mg  50 mg IntraMUSCular Q6H PRN Kathy Elizondo MD        Or    hydrOXYzine (VISTARIL) capsule 50 mg  50 mg Oral Q6H PRN Kathy Elizondo MD   50 mg at 03/30/22 0048       ALLERGIES: Patient has no known allergies. REVIEW OF SYSTEM:   ROS as noted in HPI, 12 point ROS reviewed and otherwise negative. OBJECTIVE  PHYSICAL EXAM: BP (!) 145/81   Pulse 76   Temp 98.4 °F (36.9 °C) (Oral)   Resp 16   Ht 5' 6\" (1.676 m)   Wt 130 lb (59 kg)   SpO2 98%   BMI 20.98 kg/m²      Vitals:    03/29/22 1719 03/29/22 1846 03/30/22 0901   BP: (!) 181/99 (!) 145/81 (!) 150/98   Pulse: 83 76 96   Resp: 16 16 16   Temp: 99 °F (37.2 °C) 98.4 °F (36.9 °C) 98.1 °F (36.7 °C)   TempSrc: Oral Oral Oral   SpO2: 97% 98% 100%   Weight: 130 lb (59 kg)     Height: 5' 6\" (1.676 m)       CONSTITUTIONAL:  awake, alert, cooperative, no apparent distress, and appears stated age  EYES:  Lids and lashes normal,  conjunctiva normal  ENT:  Normocephalic, without obvious abnormality, atraumatic, sinuses nontender on palpation, external ears without lesions, oral pharynx with moist mucus membranes, tonsils without erythema or exudates, gums normal and good dentition. NECK:  Supple, symmetrical, trachea midline  LUNGS:  Clear to auscultation bilaterally, no crackles or wheezing  CARDIOVASCULAR: Regular rate and rhythm, normal S1 and S2  ABDOMEN: Normal bowel sounds, soft, non-distended, non-tender  MUSCULOSKELETAL:  There is no redness, warmth, or swelling of the joints. NEUROLOGIC:  Awake, alert, oriented to name, place and time. SKIN:  Warm and dry    DATA:     Diagnostic tests reviewed for today's visit:    Most recent labs and imaging results reviewed.      ASSESSMENT AND PLAN    19 y.o. male with no PMH admitted to  with the following:    Major depression, recurrent ([address]  Patient admitted to behavorial health for evaluation and treatment     HTN  -No previous history  -Start lisinopril    Cannabinoid use  - on cessation    This is only a history and physical examination and not medical management. The patient is to contact and follow up with their primary care physician and go over any abnormal labs, imaging, findings, medical concerns, or conditions that we have and have not addressed during this encounter.     Plan of care discussed with: patient    SIGNATURE: MANUEL Fniley NP  DATE: March 30, 2022  TIME: 8:00 AM

## 2022-03-30 NOTE — PROGRESS NOTES
Reports feeling anxious and difficulty sleeping, requesting prn, prn vistaril administered, will continue to monitor.

## 2022-03-30 NOTE — CARE COORDINATION
Family/Support Name: Vidya Miles  Contact #: [phone]  Relationship to Patient: Mother    Placed call to above for collateral information,    Response: This  phoned collateral and received message: The number you have called is not a working number.    .Electronically signed by Marlys [address] on 3/30/2022 at 5:40 PM

## 2022-03-30 NOTE — PROGRESS NOTES
Pt restless to sensory room from 0137 to 0150. returned to room at [facility]

## 2022-03-30 NOTE — ED NOTES
Called 3[facility] for bed, Montse Holder RN on 3W gave [phone]. Will call back for report when 3[facility] can take the patient.       Karli Rodas RN  03/29/22 2038
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
## 2022-03-30 NOTE — GROUP NOTE
Group Therapy Note    Date: 3/30/2022    Group Start Time: 1000  Group End Time: 1100  Group Topic: Psychoeducation    [facility] KIMBERLI Morejon, IZABELLAS        Group Therapy Note    Attendees: 15         Patient's Goal:  \"to have a better day than yesterday\"      Notes:  Pt. attended the 1000 skill group. Talkative with other pt. Worked on project with interest.     Status After Intervention:  Improved    Participation Level:  Active Listener and Interactive    Participation Quality: Appropriate, Attentive and Sharing      Speech:  normal      Thought Process/Content: Logical      Affective Functioning: Congruent      Mood: slightly anxious      Level of consciousness:  Alert, Oriented x4 and Attentive      Response to Learning: Progressing to goal      Endings: None Reported    Modes of Intervention: Education, Support, Socialization and Activity      Discipline Responsible: Psychoeducational Specialist      Signature:  Perfecto Purcell

## 2022-03-30 NOTE — GROUP NOTE
Group Therapy Note    Date: 3/30/2022    Group Start Time: 1100  Group End Time: 1130  Group Topic: Psychotherapy    [facility]    ANGELA Ruiz LSW        Group Therapy Note    Attendees: 12         Patient's Goal:  Pt will demonstrate increased interpersonal interaction. Notes:  Pt participated well in group.     Status After Intervention:  Improved    Participation Level: Interactive    Participation Quality: Appropriate, Attentive, Sharing and Supportive      Speech:  normal      Thought Process/Content: Logical      Affective Functioning: Congruent      Mood: elevated      Level of consciousness:  Alert      Response to Learning: Capable of insight and Progressing to goal      Endings: None Reported    Modes of Intervention: Support      Discipline Responsible: /Counselor      Signature:  ANGELA Ruiz LSW

## 2022-03-30 NOTE — PROGRESS NOTES
Pt visible on unit on occasion. Tends to keep to self when in day room. Reports anxiety around crowds. Noted to have flat/sad affect and poor eye contact. Explains he is here because he was having constant \"negative thoughts\". Reports \"a lot\" of depression and anxiety. Endorse fleeting SI without plan or intent. Does contract for safety. Denies any Hi or AVH. Reports feeling hopeless, helpless and unmotivated since the loss of his grandparents. States his grandmother  in his arms of a heart attack and he thinks of her daily. Currently lives with father and step mother; endorses an \"okay\" relationship with them. Encouraged to come out of room and attempt to socialize with peers. States he \"will try\". Reports poor sleep last night and most nights.

## 2022-03-30 NOTE — PROGRESS NOTES
Morning [address] attended the morning community meeting on 3/30/22. Topics discussed today     [x] Introduction   Day of the week and date   Mask distribution   Current mask requirements  [x]Teams   Explanation of  Green and Blue team criteria   Nurses assigned to each team for today   Explanation about green and blue paper  o Date  o Patient's Name  o Patient's Nurse  o Goals  [x] Visitation   Announce the visiting hours for the day   Announce which team is allowed to have visitors for the day   Review any updated Covid 19 requirements for visitors during visitation  o Vaccine Card or negative Covid test within 48 hours of visit  o State Identification   Patients are reminded to alert the  at least 1 hour before visitation   [x] Unit Orientation   Coffee use   Phone location and etiquette   Shower locations  [employer] and dryer location and process   Common area expectations   Staff rounds expectation  [x] Meals    Educate patient to the menu  o The patient is encouraged to fill out the menu to get preferences at mealtime  o The patient is educated that if they do not fill out the menu, they will get the standard tray  o The coffee pot is decaf, patient encouraged to order regular coffee from menu.    Educate patient to the meal process   Patient encouraged to eat snacks provided twice daily  o Snacks may stay in patient room     [x] Discharge Process   Discharge expectations   Fill out the survey after discharge   [x] Hygiene   Daily showers encouraged  o Showers availability discussed    Daily dressing encouraged  o Discussed wearing street clothing   Education provided on where to place linens and clothing  o Linens in the hamper  o personal clothing does not go into the linen hamper  [x] Group    Patient encouraged to attend group provided   Time of Group Meetings discussed   Gentle reminder that attendance is a Physician order  [x] Movement   Chair exercises completed   Stretching completed  Notes: GOAL : \" to have abetter day than yesterday\" Electronically signed by Juanjo Huddleston on 3/30/2022 at 12:31 PM

## 2022-03-30 NOTE — PROGRESS NOTES
Pt. presents  pleasant, soft spoken and kind. Reports always having a negative voice in his head telling him mean things and making negative remarks to him. Had a plan to cut his wrist and mom found him. Reports coming to the ER for help. Poor concentration and in ability to follow several directions. Reports always feeling like a failure even before he begins a task or challenge. Poor sleep and ok appetite. Low motivation. Has supportive friends and family. Denies VH. Frequently feels suicidal but denies hi. Denies drinking ETOH and does not use any drugs. Smokes marijuana 3x weekly. Highly unmotivated.  Stressors include  1.family struggling to pay bills  2.getting and holding a job  3.driving  *working out daily, play basketball, used to play video games  Electronically signed by Janice Yip on 3/30/2022 at 2:36 PM

## 2022-03-30 NOTE — PROGRESS NOTES
Behavioral Services  Medicare Certification Upon Admission    I certify that this patient's inpatient psychiatric hospital admission is medically necessary for:    [x] (1) Treatment which could reasonably be expected to improve this patient's condition,       [x] (2) Or for diagnostic study;     AND     [x](2) The inpatient psychiatric services are provided while the individual is under the care of a physician and are included in the individualized plan of care.     Estimated length of stay/service 3-5 days    Plan for post-hospital care OP care    Electronically signed by Juventino Chavez MD on 3/30/2022 at 10:09 AM

## 2022-03-30 NOTE — PROGRESS NOTES
PT ARRIVED ON UNIT VIA WHEELCHAIR AT 2113. TOUR OF UNIT AND ROOM GIVEN. THIS WRITER AND FLAQUITA WEBSTER COMPLETED SKIN AND CONTRABAND CHECK AND BOTH NEGATIVE.

## 2022-03-30 NOTE — PROGRESS NOTES
Report per Goran Funes came in with complaints of worsening depression and SI. Patient stated that the depression started 5 years ago with his grandmothers death. This coincided with his first thoughts of SI. Patient then lost his grandfather 4 years later, which has made his depression worse and SI intensify. Within the last couple days, he has been having SI thoughts that are intensified, no plan. He denies any previous inpatient psych admissions. .  Does say that he was taking something for sleep. Denies any drug or alcohol use outside of marijuana which he says helps him think. He is appropriate with eye contact. Denies HI, AVH. COVID,ETOH NEG. POSITIVE THC. Pt being admitted per Dr Carrie Paulino, DX Major Depression. INVOLUNTARILY.

## 2022-03-30 NOTE — CARE COORDINATION
Brief Intervention and Referral to Treatment Summary    Patient was provided PHQ-9, AUDIT and DAST Screening:      PHQ-9 Score: 19  AUDIT Score:  0  DAST Score:  0    Patients substance use is considered     Low Risk/Healthy x  Moderate Risk  Harmful  Dependent    Patients depression is considered:     Minimal  Mild   Moderate X  Moderately Severe  Severe    Brief Education Was Provided N/A  Patient was receptive  Patient was not receptive      Brief Intervention Is Provided (Only for AUDIT or DAST)     Patient reports readiness to decrease and/or stop use and a plan was discussed   Patient denies readiness to decrease and/or stop use and a plan was not discussed      Recommendations/Referrals for Brief and/or Specialized Treatment Provided to Patient   Patient denied any past or present issues with drugs/alcohol besides an occasional smoking of weed. As a result no brief education or intervention was completed.      .Electronically signed by Marlys [address] on 3/30/2022 at 1:43 PM

## 2022-03-30 NOTE — H&P
[address] [place] [address] Department of Psychiatry    History and Physical - Adult         CHIEF COMPLAINT:  Depression SI    History obtained from:  patient    Patient was seen after discussing with the treatment team and reviewing the chart        CIRCUMSTANCES OF ADMISSION:     Patient came in with complaints of worsening depression and SI. Patient stated that the depression started 5 years ago with his grandmothers death. This coincided with his first thoughts of SI. Patient then lost his grandfather 4 years later, which has made his depression worse and SI intensify. Within the last couple days, he has been having SI thoughts that are intensified, no plan. He denies any previous inpatient psych admissions. He denies being on any psych medications in the past.  Does say that he was taking something for sleep. Denies any drug or alcohol use outside of marijuana which he says helps him think. He is appropriate with eye contact. HISTORY OF PRESENT ILLNESS:      The patient is a 19 y.o. male single live with parents unemployed with no significant past history MDD  Never been treated for depression  Pt has been feeling depressed for the past 5 years  Stressors:Grandmother passed away 5 years ago  Grandfather passed away 1 year ago  Lived with a friend for awhile but has since moved back in with his parents  Doesn't feel like he has any support system  Duration:  Severity: Rating mood to be around 2/10 (10- good)  Quality:melancholic  Worse all day  Content: Hopeless, worthless and helpless feeling  Suicidal thoughts - cutting his wrist or strangle self to death  I wish I am not alive. Feeling safe here  Associated symptoms:  Poor concentration, anhedonia, decrease motivation  Sleep and appetite- poor    The patient is not currently receiving care for the above psychiatric illness. Medications Prior to Admission:   No medications prior to admission.     Compliance:no    Psychiatric Review of Systems Depression: yes     Juliet or Hypomania:  no     Panic Attacks:  yes      Phobias:  no     Obsessions and Compulsions:  no     PTSD : no     Hallucinations:  no     Delusions:  no    Substance Abuse History:  ETOH: no   Marijuana: no  Opiates: no  Other Drugs: no      Past Psychiatric History:  Prior Diagnosis:  MDD  Psychiatrist: no  Therapist:no  Hospitalization: no  Hx of Suicidal Attempts: tried to cut in the past  Hx of violence:  no  ECT: no  Previous discontinued Psychiatric Med Trials: no    Past Medical History:    History reviewed. No pertinent past medical history. Past Surgical History:    History reviewed. No pertinent surgical history. Allergies:   Patient has no known allergies. Family History  History reviewed. No pertinent family history. Social History:  Born and Raised: [place]  Describes Childhood:   supportive  Education: [employer]  Employment: Unemployed, not seeking work  Relationships: single  Children: no children  Current Support: parents    Legal Hx: none  Access to weapons?:  No      EXAMINATION:    REVIEW OF SYSTEMS:    ROS:  [x] All negative/unchanged except if checked.  Explain positive(checked items) below:  [] Constitutional  [] Eyes  [] Ear/Nose/Mouth/Throat  [] Respiratory  [] CV  [] GI  []   [] Musculoskeletal  [] Skin/Breast  [] Neurological  [] Endocrine  [] Heme/Lymph  [] Allergic/Immunologic    Explanation:     Vitals:  BP (!) 150/98   Pulse 96   Temp 98.1 °F (36.7 °C) (Oral)   Resp 16   Ht 5' 6\" (1.676 m)   Wt 130 lb (59 kg)   SpO2 100%   BMI 20.98 kg/m²      Neurologic Exam:   Muscle Strength & Tone: full ROM  Gait: normal gait   Involuntary Movements: No    Mental Status Examination:    Level of consciousness:  within normal limits   Appearance:  street clothes  Behavior/Motor:  psychomotor retardation  Attitude toward examiner:  cooperative  Speech:  slow   Mood: constricted, decreased range and depressed  Affect:  mood congruent  Thought processes: treatment.     Psychotherapy:   Encourage participation in milieu and group therapy  Individual therapy as needed      Electronically signed by Jeffy Ayala MD on 3/30/2022 at 10:09 AM

## 2022-03-30 NOTE — GROUP NOTE
Group Therapy Note    Date: 3/30/2022    Group Start Time: 1400  Group End Time: 0365  Group Topic: Psychoeducation    [facility]    BALWINDER De Santiago        Group Therapy Note    Attendees: 12         Patient's Goal:  To participate in Psychoeducational group on \"Tips for Healthy Boundaries. \"    Notes:  Patient shared that he was in agreement that boundaries could go either way. Status After Intervention:  Unchanged    Participation Level:  Active Listener and Minimal    Participation Quality: Resistant      Speech:  pressured      Thought Process/Content: Logical      Affective Functioning: Congruent      Mood: depressed      Level of consciousness:  Preoccupied      Response to Learning: Resistant      Endings: None Reported    Modes of Intervention: Education      Discipline Responsible: /Counselor      Signature:  BALWINDER De Santiago

## 2022-03-30 NOTE — GROUP NOTE
Group Therapy Note    Date: 3/30/2022    Group Start Time: 1300  Group End Time: 1330  Group Topic: Healthy Living/Wellness    [facility] I    Manny Willson        Group Therapy Note    Attendees: 9/19         Patient's Goal:  To participate in game learning about healthy coping skills. Notes:  Patient actively participated in group when present. Pt arrived to group toward the end but was engaged when present. Status After Intervention:  Improved    Participation Level:  Active Listener and Interactive    Participation Quality: Appropriate and Attentive      Speech:  normal      Thought Process/Content: Logical      Affective Functioning: Constricted/Restricted      Mood: euthymic      Level of consciousness:  Alert and Attentive      Response to Learning: Progressing to goal      Endings: None Reported    Modes of Intervention: Education      Discipline Responsible: [address] [place]      Signature:  Manny Willson

## 2022-03-31 PROCEDURE — 99232 SBSQ HOSP IP/OBS MODERATE 35: CPT | Performed by: PSYCHIATRY & NEUROLOGY

## 2022-03-31 PROCEDURE — 6370000000 HC RX 637 (ALT 250 FOR IP): Performed by: PSYCHIATRY & NEUROLOGY

## 2022-03-31 PROCEDURE — 6370000000 HC RX 637 (ALT 250 FOR IP): Performed by: NURSE PRACTITIONER

## 2022-03-31 PROCEDURE — 1240000000 HC EMOTIONAL WELLNESS R&B

## 2022-03-31 RX ADMIN — MIRTAZAPINE 15 MG: 15 TABLET, FILM COATED ORAL at 20:44

## 2022-03-31 RX ADMIN — TRAZODONE HYDROCHLORIDE 50 MG: 50 TABLET ORAL at 20:44

## 2022-03-31 RX ADMIN — LISINOPRIL 5 MG: 5 TABLET ORAL at 09:29

## 2022-03-31 NOTE — PROGRESS NOTES
Morning [address] attended the morning community meeting on 3/31/22. Topics discussed today     [x] Introduction   Day of the week and date   Mask distribution   Current mask requirements  [x]Teams   Explanation of  Green and Blue team criteria   Nurses assigned to each team for today   Explanation about green and blue paper  o Date  o Patient's Name  o Patient's Nurse  o Goals  [x] Visitation   Announce the visiting hours for the day   Announce which team is allowed to have visitors for the day   Review any updated Covid 19 requirements for visitors during visitation  o Vaccine Card or negative Covid test within 48 hours of visit  o State Identification   Patients are reminded to alert the  at least 1 hour before visitation   [x] Unit Orientation   Coffee use   Phone location and etiquette   Shower locations  [employer] and dryer location and process   Common area expectations   Staff rounds expectation  [x] Meals    Educate patient to the menu  o The patient is encouraged to fill out the menu to get preferences at mealtime  o The patient is educated that if they do not fill out the menu, they will get the standard tray  o The coffee pot is decaf, patient encouraged to order regular coffee from menu.    Educate patient to the meal process   Patient encouraged to eat snacks provided twice daily  o Snacks may stay in patient room     [x] Discharge Process   Discharge expectations   Fill out the survey after discharge   [x] Hygiene   Daily showers encouraged  o Showers availability discussed    Daily dressing encouraged  o Discussed wearing street clothing   Education provided on where to place linens and clothing  o Linens in the hamper  o personal clothing does not go into the linen hamper  [x] Group    Patient encouraged to attend group provided   Time of Group Meetings discussed   Gentle reminder that attendance is a Physician order  [x] Movement   Chair exercises completed   Stretching completed  Notes: GOAL : \" to be less nervous around others\" Electronically signed by Magamairani Number on 3/31/2022 at 12:11 PM

## 2022-03-31 NOTE — GROUP NOTE
Group Therapy Note    Date: 3/31/2022    Group Start Time: 1630  Group End Time: 1700  Group Topic: Healthy Living/Wellness    [facility]    Yakov Shows        Group Therapy Note    Attendees: 11/16         Patient's Goal:  To learn about and practice communication skills. Notes:  Patient participated in discussion and activity.     Status After Intervention:  Improved    Participation Level: Interactive    Participation Quality: Appropriate, Attentive and Sharing      Speech:  normal      Thought Process/Content: Logical      Affective Functioning: Congruent      Mood: euthymic      Level of consciousness:  Alert and Attentive      Response to Learning: Able to verbalize current knowledge/experience      Endings: None Reported    Modes of Intervention: Education      Discipline Responsible: [address], [email or web address]      Signature:  Yakov Simon

## 2022-03-31 NOTE — GROUP NOTE
Group Therapy Note    Date: 3/31/2022    Group Start Time: 1400  Group End Time: 0100  Group Topic: Psychoeducation    [facility]    ANGELA Rubio LSW        Group Therapy Note    patient refused to attend pschoeducational group at 2:00pm after encouragement from staff.       Signature:  ANGELA Rubio LSW

## 2022-03-31 NOTE — GROUP NOTE
Group Therapy Note    Date: 3/31/2022    Group Start Time: 1100  Group End Time: 1140  Group Topic: Psychotherapy    [facility]    RORO Peña        Group Therapy Note    Attendees: 14/21         Patient's Goal:  \"to make more friends\"    Notes:  Engaged when prompted     Status After Intervention:  Improved    Participation Level:  Active Listener and Interactive    Participation Quality: Appropriate, Attentive, Sharing and Supportive      Speech:  normal      Thought Process/Content: Logical      Affective Functioning: Congruent      Mood: anxious and depressed      Level of consciousness:  Alert      Response to Learning: Progressing to goal      Endings: None Reported    Modes of Intervention: Education      Discipline Responsible: /Counselor      Signature:  RORO Peña

## 2022-03-31 NOTE — PROGRESS NOTES
Patient denies SI, HI or hallucinations. He is appropriately social with peers and continued to work at the puzzles with peers.  He shared it has allowed him to be less anxious and more comfortable being social.

## 2022-03-31 NOTE — PROGRESS NOTES
Pt has been working a puzzle this am in the dinning room, noted to be social with select peers, took am Zestril, denied anxiety or pain this am.

## 2022-03-31 NOTE — PROGRESS NOTES
Pt oou social with select peers. Pt assisting peers with a puzzle. Affect bright. Pt reports feeling comfortable on the unit.

## 2022-03-31 NOTE — PROGRESS NOTES
Patient did not attend group despite staff encouragement.   Electronically signed by Luis F Goode on 3/30/2022 at 9:40 PM

## 2022-03-31 NOTE — GROUP NOTE
Group Therapy Note    Date: 3/31/2022    Group Start Time: 1000  Group End Time: 1100  Group Topic: Psychoeducation    [facility] KIMBERLI William, IZABELLAS        Group Therapy Note    Attendees: 14         Patient's Goal:  \"to be less nervous around others\"    Notes:  Pt. attended the 1000 skill group. Attentive and feeling better. Worked on project well. Slightly more talkative than stated above. Status After Intervention:  Improved    Participation Level:  Active Listener and Interactive    Participation Quality: Appropriate, Attentive and Sharing      Speech:  normal      Thought Process/Content: Logical      Affective Functioning: Congruent      Mood: calm      Level of consciousness:  Alert, Oriented x4 and Attentive      Response to Learning: Progressing to goal      Endings: None Reported    Modes of Intervention: Education, Support, Socialization and Activity      Discipline Responsible: Psychoeducational Specialist      Signature:  Tolu Terrell

## 2022-03-31 NOTE — PROGRESS NOTES
Patient is social with peers and spent time working on a puzzle. He reports that his anxiety has decreased since being here. \"I feel a little better\", states patient. He continues to feel depressed, but that is gradually improving. He denies SI, HI, or hallucinations. Patient admits to being bullied when younger, especially by his cousins.

## 2022-03-31 NOTE — PROGRESS NOTES
Patient did not attend group despite staff encouragement.   Electronically signed by Yennifer Gold on 3/30/2022 at 9:57 PM

## 2022-03-31 NOTE — GROUP NOTE
Group Therapy Note    Date: 3/31/2022    Group Start Time: 1300  Group End Time: 1400  Group Topic: Healthy Living/Wellness    [facility]    Mary Patterson RN        Group Therapy Note    Attendees: 11           Patient's Goal:  To participate in healthy living group    Notes:  Pt had good participation in group    Status After Intervention:  Unchanged    Participation Level:  Active Listener and Minimal    Participation Quality: Appropriate, Attentive, Sharing and Supportive      Speech:  normal      Thought Process/Content: Logical      Affective Functioning: Congruent      Mood: euthymic      Level of consciousness:  Alert, Oriented x4 and Attentive      Response to Learning: Progressing to goal      Endings: None Reported    Modes of Intervention: Education, Support, Socialization, Clarifying and Problem-solving      Discipline Responsible: Registered Nurse      Signature:  Mary Patterson RN

## 2022-04-01 LAB
EKG ATRIAL RATE: 74 BPM
EKG P AXIS: 57 DEGREES
EKG P-R INTERVAL: 114 MS
EKG Q-T INTERVAL: 394 MS
EKG QRS DURATION: 92 MS
EKG QTC CALCULATION (BAZETT): 437 MS
EKG R AXIS: 84 DEGREES
EKG T AXIS: 41 DEGREES
EKG VENTRICULAR RATE: 74 BPM

## 2022-04-01 PROCEDURE — 90833 PSYTX W PT W E/M 30 MIN: CPT | Performed by: PSYCHIATRY & NEUROLOGY

## 2022-04-01 PROCEDURE — 1240000000 HC EMOTIONAL WELLNESS R&B

## 2022-04-01 PROCEDURE — 6370000000 HC RX 637 (ALT 250 FOR IP): Performed by: NURSE PRACTITIONER

## 2022-04-01 PROCEDURE — 6370000000 HC RX 637 (ALT 250 FOR IP): Performed by: PSYCHIATRY & NEUROLOGY

## 2022-04-01 PROCEDURE — 93010 ELECTROCARDIOGRAM REPORT: CPT | Performed by: INTERNAL MEDICINE

## 2022-04-01 PROCEDURE — 99232 SBSQ HOSP IP/OBS MODERATE 35: CPT | Performed by: PSYCHIATRY & NEUROLOGY

## 2022-04-01 RX ADMIN — MIRTAZAPINE 15 MG: 15 TABLET, FILM COATED ORAL at 20:53

## 2022-04-01 RX ADMIN — LISINOPRIL 5 MG: 5 TABLET ORAL at 09:40

## 2022-04-01 RX ADMIN — TRAZODONE HYDROCHLORIDE 50 MG: 50 TABLET ORAL at 20:53

## 2022-04-01 NOTE — PROGRESS NOTES
Pt out, social and working a puzzle with peers. Pt stated he is glad to be here because it helped him work on talking to people. Pt is future oriented and plans to get a job once discharged and establish a routine. Pt is worried about how that's going to work out. Educated pt on taking small steps and start off with a smile and hello when approaching people. Pt denies SI/HI/AVH, anxiety and depression.

## 2022-04-01 NOTE — GROUP NOTE
Group Therapy Note    Date: 4/1/2022    Group Start Time: 1300  Group End Time: 5754  Group Topic: Healthy Living/Wellness    [facility]    Anai Chandler        Group Therapy Note    Attendees: 6/17         Patient's Goal:  To participate in a game sharing with peers    Notes:  Patient actively participated in group    Status After Intervention:  Improved    Participation Level:  Active Listener and Interactive    Participation Quality: Sharing      Speech:  normal      Thought Process/Content: Logical      Affective Functioning: Constricted/Restricted      Mood: anxious      Level of consciousness:  Alert and Attentive      Response to Learning: Progressing to goal      Endings: None Reported    Modes of Intervention: Socialization      Discipline Responsible: [address], [employer]      Signature:  Anai Chandler

## 2022-04-01 NOTE — PROGRESS NOTES
Patient did not attend group despite staff encouragement.   Electronically signed by Yimi Limon on 3/31/2022 at 9:22 PM]

## 2022-04-01 NOTE — GROUP NOTE
Group Therapy Note    Date: 4/1/2022    Group Start Time: 5356  Group End Time: 1700  Group Topic: Healthy Living/Wellness    [facility]    Hayley Livingston RN        Group Therapy Note    Attendees: 10         Patient's Goal:  Discussed various types of rest and examples of each. Notes:  Pt had good participation    Status After Intervention:  Unchanged    Participation Level:  Active Listener    Participation Quality: Appropriate      Speech:  normal      Thought Process/Content: Logical      Affective Functioning: Congruent      Mood: euthymic      Level of consciousness:  Alert, Oriented x4 and Attentive      Response to Learning: Progressing to goal      Endings: None Reported    Modes of Intervention: Support      Discipline Responsible: Registered Nurse      Signature:  Hayley Livingston RN

## 2022-04-01 NOTE — GROUP NOTE
Group Therapy Note    Date: 4/1/2022    Group Start Time: 1100  Group End Time: 1140  Group Topic: Healthy Living/Wellness    [facility]    Dori Loja RN        Group Therapy Note    Attendees: 12/20         Patient's Goal:   Learning about social support and the benefits of social support/building relationships. Status After Intervention:  Unchanged    Participation Level:  Active Listener and Interactive    Participation Quality: Appropriate and Resistant      Speech:  normal      Thought Process/Content: Logical      Affective Functioning: Congruent      Mood: euthymic      Level of consciousness:  Preoccupied      Response to Learning: Progressing to goal      Endings: None Reported    Modes of Intervention: Education, Support and Socialization      Discipline Responsible: Registered Nurse      Signature:  Dori Loja RN

## 2022-04-01 NOTE — GROUP NOTE
Group Therapy Note    Date: 4/1/2022    Group Start Time: 1400  Group End Time: 0447  Group Topic: Psychoeducation    [facility]    ANGELA Ruiz LSW        Group Therapy Note    Attendees: 10         Patient's Goal:  To participate in a Psychoeducational group    Notes:  Patient participated in coping skills for anger, anxiety, and depression.     Status After Intervention:  Improved    Participation Level: Interactive    Participation Quality: Appropriate      Speech:  normal      Thought Process/Content: Logical      Affective Functioning: Congruent      Mood: calm      Level of consciousness:  Alert      Response to Learning: Able to verbalize current knowledge/experience      Endings: None Reported    Modes of Intervention: Education      Discipline Responsible: /Counselor      Signature:  ANGELA Ruiz LSW

## 2022-04-01 NOTE — PROGRESS NOTES
Patient did not attend group despite staff encouragement.   Electronically signed by Patty Tellez on 3/31/2022 at 9:36 PM

## 2022-04-01 NOTE — PROGRESS NOTES
Patient signed Voluntary. He remains social with peers and describes comfort and acceptance in this social setting. He reports reduction in depression and anxiety here. Speaks of his struggle with memory especially sequencing directions. Can only recall first thing he hears. This has been difficult at jobs and patient describes a paranoid reaction around people. 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 No current SI, HI, or hallucinations.

## 2022-04-01 NOTE — GROUP NOTE
Morning [address] attended the morning community meeting on 4/1/22. Topics discussed today     [x] Introduction   Day of the week and date   Mask distribution   Current mask requirements  [x]Teams   Explanation of  Green and Blue team criteria   Nurses assigned to each team for today   Explanation about green and blue paper  o Date  o Patient's Name  o Patient's Nurse  o Goals  [x] Visitation   Announce the visiting hours for the day   Announce which team is allowed to have visitors for the day   Review any updated Covid 19 requirements for visitors during visitation  o Vaccine Card or negative Covid test within 48 hours of visit  o State Identification   Patients are reminded to alert the  at least 1 hour before visitation   [x] Unit Orientation   Coffee use   Phone location and etiquette   Shower locations  [employer] and dryer location and process   Common area expectations   Staff rounds expectation  [x] Meals    Educate patient to the menu  o The patient is encouraged to fill out the menu to get preferences at mealtime  o The patient is educated that if they do not fill out the menu, they will get the standard tray  o The coffee pot is decaf, patient encouraged to order regular coffee from menu.    Educate patient to the meal process   Patient encouraged to eat snacks provided twice daily  o Snacks may stay in patient room     [x] Discharge Process   Discharge expectations   Fill out the survey after discharge   [x] Hygiene   Daily showers encouraged  o Showers availability discussed    Daily dressing encouraged  o Discussed wearing street clothing   Education provided on where to place linens and clothing  o Linens in the hamper  o personal clothing does not go into the linen hamper  [x] Group    Patient encouraged to attend group provided   Time of Group Meetings discussed   Gentle reminder that attendance is a Physician order  [x] Movement   Chair exercises completed   Stretching completed  Notes:   Goal: \"to be more positive about myself\"  Signature:  Emmy Espinoza

## 2022-04-02 PROCEDURE — 6370000000 HC RX 637 (ALT 250 FOR IP): Performed by: NURSE PRACTITIONER

## 2022-04-02 PROCEDURE — 6370000000 HC RX 637 (ALT 250 FOR IP): Performed by: PSYCHIATRY & NEUROLOGY

## 2022-04-02 PROCEDURE — 1240000000 HC EMOTIONAL WELLNESS R&B

## 2022-04-02 RX ADMIN — LISINOPRIL 5 MG: 5 TABLET ORAL at 08:18

## 2022-04-02 RX ADMIN — MIRTAZAPINE 15 MG: 15 TABLET, FILM COATED ORAL at 21:31

## 2022-04-02 NOTE — GROUP NOTE
Group Therapy Note    Date: 4/2/2022    Group Start Time: 1007  Group End Time: 1100  Group Topic: Psychoeducation    [facility]    Mushtaq Newton        Group Therapy Note    Attendees: 8         Patient's Goal:  \"To work on my puzzle\"     Notes:  Patient attended the 1000 skills group. Patient was quiet but he did interact at times. He work fairly on his task.     Status After Intervention:  Unchanged    Participation Level: Fair    Participation Quality: Appropriate      Speech:  normal      Thought Process/Content: Linear      Affective Functioning: Congruent      Mood: calm      Level of consciousness:  Alert      Response to Learning: Progressing to goal      Endings: None Reported    Modes of Intervention: Education, Socialization and Activity      Discipline Responsible: Psychoeducational Specialist      Signature:  Mushtaq Newton

## 2022-04-02 NOTE — GROUP NOTE
Group Therapy Note    Date: 4/2/2022    Group Start Time: 1100  Group End Time: 8407  Group Topic: Psychoeducation    [facility]    ANGELA De LSW        Group Therapy Note    Attendees: 8         Patient's Goal: to participate in \"check in\"    Notes:  Patient's goal is to learn how to communicate better.  He participates and attends all groups    Status After Intervention:  Improved    Participation Level: Interactive    Participation Quality: Appropriate      Speech:  normal      Thought Process/Content: Logical      Affective Functioning: Congruent      Mood: calm      Level of consciousness:  Alert      Response to Learning: Able to verbalize current knowledge/experience      Endings: None Reported    Modes of Intervention: Education      Discipline Responsible: /Counselor      Signature:  AGNELA De LSW

## 2022-04-02 NOTE — PROGRESS NOTES
Ct out of room, sitting with select peers working on puzzle. Pleasant upon approach, good eye contact. Admits that coming here was the right thing to do but states, \" I have to take it slow going back out there\". \"I feel good now but if a get a job I have to meet new people and start all over again\". Does share having social anxiety but is doing well here. Eating, sleeping with use of Trazodone. States \"I feel much better\".

## 2022-04-02 NOTE — FLOWSHEET NOTE
Patient is alert and orient, out on unit, social with peers coloring etc.  Patient has flat affect but brightens upon approach. Patient attends groups and reports sleep and appetite is good. Patient denies SI/HI and hallucinations. Patient rates his depression and anxiety a 2 out of 10, with 10 being the worst.  Patient states he feels much better than he did when coming in.

## 2022-04-02 NOTE — PROGRESS NOTES
Morning [address] attended the morning community meeting on 4/2/22. Topics discussed today     [x] Introduction   Day of the week and date   Mask distribution   Current mask requirements  [x]Teams   Explanation of  Green and Blue team criteria   Nurses assigned to each team for today   Explanation about green and blue paper  o Date  o Patient's Name  o Patient's Nurse  o Goals  [x] Visitation   Announce the visiting hours for the day   Announce which team is allowed to have visitors for the day   Review any updated Covid 19 requirements for visitors during visitation  o Vaccine Card or negative Covid test within 48 hours of visit  o State Identification   Patients are reminded to alert the  at least 1 hour before visitation   [x] Unit Orientation   Coffee use   Phone location and etiquette   Shower locations  [employer] and dryer location and process   Common area expectations   Staff rounds expectation  [x] Meals    Educate patient to the menu  o The patient is encouraged to fill out the menu to get preferences at mealtime  o The patient is educated that if they do not fill out the menu, they will get the standard tray  o The coffee pot is decaf, patient encouraged to order regular coffee from menu.    Educate patient to the meal process   Patient encouraged to eat snacks provided twice daily  o Snacks may stay in patient room     [x] Discharge Process   Discharge expectations   Fill out the survey after discharge   [x] Hygiene   Daily showers encouraged  o Showers availability discussed    Daily dressing encouraged  o Discussed wearing street clothing   Education provided on where to place linens and clothing  o Linens in the hamper  o personal clothing does not go into the linen hamper  [x] Group    Patient encouraged to attend group provided   Time of Group Meetings discussed   Gentle reminder that attendance is a Physician order  [x] Movement   Chair exercises completed   Stretching completed  Notes:Goal - \"To work on my puzzle\" Electronically signed by Kenyatta Hollingsworth, [address] on 4/2/2022 at 9:47 AM

## 2022-04-02 NOTE — GROUP NOTE
Group Therapy Note    Date: 4/2/2022    Group Start Time: 0712  Group End Time: 4515  Group Topic: Healthy Living/Wellness    [facility]    Klarissa Matta        Group Therapy Note    Attendees: 12/20         Patient's Goal:  To learn how coping skills affect mood. Notes:  Patient sat quietly and refused to participate in group.     Status After Intervention:  Unchanged    Participation Level: None    Participation Quality: Resistant      Speech:  mute      Thought Process/Content: Logical      Affective Functioning: Flat      Mood: euthymic      Level of consciousness:  Alert      Response to Learning: Resistant      Endings: None Reported    Modes of Intervention: Education      Discipline Responsible: [address] [employer]      Signature:  Klarissa Matta

## 2022-04-02 NOTE — GROUP NOTE
Group Therapy Note    Date: 4/2/2022    Group Start Time: 1400  Group End Time: 9929  Group Topic: Healthy Living/Wellness    [facility]    Domingo Morrow RN        Group Therapy Note    Attendees: 7/17         Patient's Goal:  To attend healthy living group    Notes:  Patient participated in group    Status After Intervention:  Improved    Participation Level:  Active Listener and Interactive    Participation Quality: Appropriate, Attentive, Sharing and Supportive      Speech:  normal      Thought Process/Content: Logical  Linear      Affective Functioning: Congruent      Mood: WNL      Level of consciousness:  Alert, Oriented x4 and Attentive      Response to Learning: Able to verbalize current knowledge/experience, Able to verbalize/acknowledge new learning, Able to retain information and Capable of insight      Endings: None Reported    Modes of Intervention: Education and Support      Discipline Responsible: Registered Nurse      Signature:  Domingo Morrow RN

## 2022-04-02 NOTE — PROGRESS NOTES
[facility]. FOLLOW-UP NOTE       4/1/2022     Patient was seen and examined in person, Chart reviewed   Patient's case discussed with staff/team    Chief Complaint: Depression    Interim History:   No change in presentation  Pt still feel depressed  Less anxious, slept better  Tolerating medication  Ruminating about his stress  Seen interacting with peers  Appetite:   [] Normal/Unchanged  [] Increased  [x] Decreased      Sleep:       [] Normal/Unchanged  [x] Fair       [] Poor              Energy:    [] Normal/Unchanged  [] Increased  [x] Decreased        SI [x] Present  [] Absent    HI  []Present  [x] Absent     Aggression:  [] yes  [x] no    Patient is [x] able  [] unable to CONTRACT FOR SAFETY     PAST MEDICAL/PSYCHIATRIC HISTORY:   History reviewed. No pertinent past medical history. FAMILY/SOCIAL HISTORY:  History reviewed. No pertinent family history. Social History     Socioeconomic History    Marital status: Single     Spouse name: Not on file    Number of children: Not on file    Years of education: Not on file    Highest education level: Not on file   Occupational History    Not on file   Tobacco Use    Smoking status: Current Every Day Smoker     Types: Cigarettes    Smokeless tobacco: Never Used   Vaping Use    Vaping Use: Never used   Substance and Sexual Activity    Alcohol use: Not on file     Comment: social    Drug use: Yes     Types: Marijuana Marry Rizwan)    Sexual activity: Not on file   Other Topics Concern    Not on file   Social History Narrative    Not on file     Social Determinants of Health     Financial Resource Strain:     Difficulty of Paying Living Expenses: Not on file   Food Insecurity:     Worried About Running Out of Food in the Last Year: Not on file    Adrian of Food in the Last Year: Not on file   Transportation Needs:     Lack of Transportation (Medical): Not on file    Lack of Transportation (Non-Medical):  Not on file Physical Activity:     Days of Exercise per Week: Not on file    Minutes of Exercise per Session: Not on file   Stress:     Feeling of Stress : Not on file   Social Connections:     Frequency of Communication with Friends and Family: Not on file    Frequency of Social Gatherings with Friends and Family: Not on file    Attends Anabaptist Services: Not on file    Active Member of [address] [place] [id number] or Organizations: Not on file    Attends Club or Organization Meetings: Not on file    Marital Status: Not on file   Intimate Partner Violence:     Fear of Current or Ex-Partner: Not on file    Emotionally Abused: Not on file    Physically Abused: Not on file    Sexually Abused: Not on file   Housing Stability:     Unable to Pay for Housing in the Last Year: Not on file    Number of [place] in the Last Year: Not on file    Unstable Housing in the Last Year: Not on file           ROS:  [x] All negative/unchanged except if checked.  Explain positive(checked items) below:  [] Constitutional  [] Eyes  [] Ear/Nose/Mouth/Throat  [] Respiratory  [] CV  [] GI  []   [] Musculoskeletal  [] Skin/Breast  [] Neurological  [] Endocrine  [] Heme/Lymph  [] Allergic/Immunologic    Explanation:     MEDICATIONS:    Current Facility-Administered Medications:     lisinopril (PRINIVIL;ZESTRIL) tablet 5 mg, 5 mg, Oral, Daily, MANUEL Carpio NP, 5 mg at 04/01/22 0940    mirtazapine (REMERON) tablet 15 mg, 15 mg, Oral, Nightly, Brenton Bee MD, 15 mg at 04/01/22 2053    acetaminophen (TYLENOL) tablet 650 mg, 650 mg, Oral, Q4H PRN, Bebeto Serrato MD    magnesium hydroxide (MILK OF MAGNESIA) 400 MG/5ML suspension 30 mL, 30 mL, Oral, Daily PRN, Bebeto Serrato MD    aluminum & magnesium hydroxide-simethicone (MAALOX) 200-200-20 MG/5ML suspension 30 mL, 30 mL, Oral, PRN, Bebeto Serrato MD    haloperidol (HALDOL) tablet 5 mg, 5 mg, Oral, Q6H PRN **OR** haloperidol lactate (HALDOL) injection 5 mg, 5 mg, IntraMUSCular, Q6H PRN, Bladimir Brown MD    benztropine mesylate (COGENTIN) injection 2 mg, 2 mg, IntraMUSCular, BID PRN, Bladimir Brown MD    traZODone (DESYREL) tablet 50 mg, 50 mg, Oral, Nightly PRN, Bladimir Brown MD, 50 mg at 04/01/22 2053    hydrOXYzine (VISTARIL) injection 50 mg, 50 mg, IntraMUSCular, Q6H PRN **OR** hydrOXYzine (VISTARIL) capsule 50 mg, 50 mg, Oral, Q6H PRN, Bladimir Brown MD, 50 mg at 03/30/22 0048      Examination:  /86   Pulse 99   Temp 98.6 °F (37 °C)   Resp 18   Ht 5' 6\" (1.676 m)   Wt 130 lb (59 kg)   SpO2 99%   BMI 20.98 kg/m²   Gait - steady  Medication side effects(SE): no    Mental Status Examination:    Level of consciousness:  within normal limits   Appearance:  street clothes  Behavior/Motor:  psychomotor retardation  Attitude toward examiner:  cooperative  Speech:  slow   Mood: constricted, decreased range and depressed  Affect:  mood congruent  Thought processes:  coherent   Thought content:  Suicidal Ideation:  passive  Cognition:  oriented to person, place, and time   Concentration intact  Memory intact  Insight poor   Judgement fair   Fund of Knowledge adequate    Mini Mental Status 30/30      DIAGNOSIS:     MDD recurrent severe  KRISTOFER          LABS:    No results for input(s): WBC, HGB, PLT in the last 72 hours. No results for input(s): NA, K, CL, CO2, BUN, CREATININE, GLUCOSE in the last 72 hours. No results for input(s): BILITOT, ALKPHOS, AST, ALT in the last 72 hours.   Lab Results   Component Value Date    LABAMPH Neg 03/29/2022    BARBSCNU Neg 03/29/2022    LABBENZ Neg 03/29/2022    LABMETH Neg 03/29/2022    OPIATESCREENURINE Neg 03/29/2022    PHENCYCLIDINESCREENURINE Neg 03/29/2022    ETOH <10 03/29/2022     Lab Results   Component Value Date    TSH 2.120 03/29/2022     No results found for: LITHIUM  Lab Results   Component Value Date    VALPROATE <2.8 (L) 03/29/2022       RISK ASSESSMENT: high    Treatment Plan:  Reviewed current Medications with the patient. meds as ordered  Risks, benefits, side effects, drug-to-drug interactions and alternatives to treatment were discussed. Collateral information:   CD evaluation  Encourage patient to attend group and other milieu activities.   Discharge planning discussed with the patient and treatment team.    PSYCHOTHERAPY/COUNSELING:  [x] Therapeutic interview  [x] Supportive  [] CBT  [] Ongoing  [] Other  Patient was seen 1:1 for 20 minutes, other than E&M time spent, focusing on      - coping skills techniques     - Anxiety management techniques discussed including deep breathing exercise and PMR     - discussing patients strength and weakness      - Focusing on negative cognition and maladaptive thoughts, which is feeding and maintaining the depression symptoms    [x] Patient continues to need, on a daily basis, active treatment furnished directly by or requiring the supervision of inpatient psychiatric personnel      Anticipated Length of stay:            Electronically signed by Bhavin Garcia MD on 4/1/2022 at 10:25 PM

## 2022-04-03 PROCEDURE — 6370000000 HC RX 637 (ALT 250 FOR IP): Performed by: NURSE PRACTITIONER

## 2022-04-03 PROCEDURE — 6370000000 HC RX 637 (ALT 250 FOR IP): Performed by: PSYCHIATRY & NEUROLOGY

## 2022-04-03 PROCEDURE — 1240000000 HC EMOTIONAL WELLNESS R&B

## 2022-04-03 RX ADMIN — TRAZODONE HYDROCHLORIDE 50 MG: 50 TABLET ORAL at 22:24

## 2022-04-03 RX ADMIN — MIRTAZAPINE 15 MG: 15 TABLET, FILM COATED ORAL at 21:23

## 2022-04-03 RX ADMIN — LISINOPRIL 5 MG: 5 TABLET ORAL at 08:25

## 2022-04-03 NOTE — PROGRESS NOTES
Morning [address] attended the morning community meeting on 4/3/22. Topics discussed today     [x] Introduction   Day of the week and date   Mask distribution   Current mask requirements  [x]Teams   Explanation of  Green and Blue team criteria   Nurses assigned to each team for today   Explanation about green and blue paper  o Date  o Patient's Name  o Patient's Nurse  o Goals  [x] Visitation   Announce the visiting hours for the day   Announce which team is allowed to have visitors for the day   Review any updated Covid 19 requirements for visitors during visitation  o Vaccine Card or negative Covid test within 48 hours of visit  o State Identification   Patients are reminded to alert the  at least 1 hour before visitation   [x] Unit Orientation   Coffee use   Phone location and etiquette   Shower locations  [employer] and dryer location and process   Common area expectations   Staff rounds expectation  [x] Meals    Educate patient to the menu  o The patient is encouraged to fill out the menu to get preferences at mealtime  o The patient is educated that if they do not fill out the menu, they will get the standard tray  o The coffee pot is decaf, patient encouraged to order regular coffee from menu.    Educate patient to the meal process   Patient encouraged to eat snacks provided twice daily  o Snacks may stay in patient room     [x] Discharge Process   Discharge expectations   Fill out the survey after discharge   [x] Hygiene   Daily showers encouraged  o Showers availability discussed    Daily dressing encouraged  o Discussed wearing street clothing   Education provided on where to place linens and clothing  o Linens in the hamper  o personal clothing does not go into the linen hamper  [x] Group    Patient encouraged to attend group provided   Time of Group Meetings discussed   Gentle reminder that attendance is a Physician order  [x] Movement   Chair exercises completed   Stretching completed  Notes:Goal - \"Get through the day\" Electronically signed by LORETTA Gonzales on 4/3/2022 at 1:22 PM

## 2022-04-03 NOTE — PROGRESS NOTES
Remains out of room with select peers, playing cards and games. States, \"I have been out trying to be social with everyone, getting ready for the real world\". No issues reported at this time. 2224 - Requested and received Trazodone for sleep.

## 2022-04-03 NOTE — GROUP NOTE
Group Therapy Note    Date: 4/2/2022    Group Start Time: 2105  Group End Time: 2120  Group Topic: Wrap-Up    [facility]    [email or web address]        Group Therapy Note    Attendees: 9/20         Patient's Goal:  \"to work on the puzzle\"    Notes:  Patient reported meeting their goal for the day. Patient shared he enjoyed meeting new people today.     Status After Intervention:  Unchanged    Participation Level: Interactive    Participation Quality: Appropriate, Attentive and Sharing      Speech:  normal      Thought Process/Content: Logical      Affective Functioning: Congruent      Mood: euthymic      Level of consciousness:  Alert and Attentive      Response to Learning: Progressing to goal      Endings: None Reported    Modes of Intervention: Support      Discipline Responsible: [place]      Signature:  [email or web address]

## 2022-04-03 NOTE — GROUP NOTE
Group Therapy Note    Date: 4/3/2022    Group Start Time: 1000  Group End Time: 1976  Group Topic: Psychoeducation    [facility]    Aarti Martinez        Group Therapy Note    Attendees: 11         Patient's Goal:  \"To get through the day\"    Notes:  Patient attended the 1000 skills group. Patient was attentive to his task, had good concentration and overall participation was good.     Status After Intervention:  Improved    Participation Level: Good    Participation Quality: Appropriate and Attentive      Speech:  normal      Thought Process/Content: Linear      Affective Functioning: Congruent      Mood: calm      Level of consciousness:  Alert      Response to Learning: Progressing to goal      Endings: None Reported    Modes of Intervention: Education, Socialization and Activity      Discipline Responsible: Psychoeducational Specialist      Signature:  Aarti Martinez

## 2022-04-03 NOTE — GROUP NOTE
Group Therapy Note    Date: 4/3/2022    Group Start Time: 1100  Group End Time: 8540  Group Topic: Group Therapy    [facility]    BALWIND[employer] Winters        Group Therapy Note    Attendees: 9         Patient's Goal:  To participate in a goal oriented group. Notes:  Patient stated his goal is to make progress on his difficulties with social anxiety. Status After Intervention:  Improved    Participation Level: Active Listener    Participation Quality: Appropriate      Speech:  normal      Thought Process/Content: Logical      Affective Functioning: Congruent      Mood: anxious      Level of consciousness:  Alert      Response to Learning: Able to verbalize current knowledge/experience      Endings: None Reported    Modes of Intervention: Education      Discipline Responsible: /Counselor      Signature:   BALWINDER Winters

## 2022-04-03 NOTE — GROUP NOTE
Group Therapy Note    Date: 4/2/2022    Group Start Time: 1930  Group End Time: 2015  Group Topic: Recreational    [facility]    Neo Bruner        Group Therapy Note    Attendees: 10/20         Patient's Goal:  To participate in activity group. Notes:  Patient participated in activity group with peers.      Status After Intervention:  Unchanged    Participation Level: Interactive    Participation Quality: Appropriate and Attentive      Speech:  normal      Thought Process/Content: Logical      Affective Functioning: Congruent      Mood: euthymic      Level of consciousness:  Alert and Attentive      Response to Learning: Progressing to goal      Endings: None Reported    Modes of Intervention: Activity      Discipline Responsible: [address], [employer]      Signature:  Neo Bruner

## 2022-04-03 NOTE — FLOWSHEET NOTE
Addended by: Eliseo Rodríguez on: 12/30/2021 10:38 AM     Modules accepted: Orders Patient is alert and orient, out on unit, social with peers, doing puzzles etc.  Patient is calm and co-operative, he denies SI/HI and hallucinations. Patient states his anxiety and depression has improved a lot since coming to hospital and his biggest issue is the social anxiety but it has been good here. Patient reports good sleep and appetite.

## 2022-04-03 NOTE — GROUP NOTE
Group Therapy Note    Date: 4/3/2022    Group Start Time: 5065  Group End Time: 2386  Group Topic: Healthy Living/Wellness    [facility]    Charis Valentin        Group Therapy Note    Attendees: 15/19         Patient's Goal:  To learn about nutrition and healthy eating. Notes:  Patient participated in group discussion.      Status After Intervention:  Unchanged    Participation Level: Interactive    Participation Quality: Appropriate and Attentive      Speech:  normal      Thought Process/Content: Logical      Affective Functioning: Flat      Mood: euthymic      Level of consciousness:  Alert and Attentive      Response to Learning: Progressing to goal      Endings: None Reported    Modes of Intervention: Education      Discipline Responsible: [address], [employer]      Signature:  Charis Valentin

## 2022-04-04 VITALS
OXYGEN SATURATION: 100 % | WEIGHT: 130 LBS | TEMPERATURE: 98.1 F | BODY MASS INDEX: 20.89 KG/M2 | HEIGHT: 66 IN | RESPIRATION RATE: 15 BRPM | DIASTOLIC BLOOD PRESSURE: 80 MMHG | HEART RATE: 111 BPM | SYSTOLIC BLOOD PRESSURE: 143 MMHG

## 2022-04-04 PROCEDURE — 6370000000 HC RX 637 (ALT 250 FOR IP): Performed by: NURSE PRACTITIONER

## 2022-04-04 PROCEDURE — 99239 HOSP IP/OBS DSCHRG MGMT >30: CPT | Performed by: PSYCHIATRY & NEUROLOGY

## 2022-04-04 RX ORDER — TRAZODONE HYDROCHLORIDE 150 MG/1
75 TABLET ORAL NIGHTLY PRN
Status: DISCONTINUED | OUTPATIENT
Start: 2022-04-04 | End: 2022-04-04 | Stop reason: HOSPADM

## 2022-04-04 RX ORDER — MIRTAZAPINE 15 MG/1
15 TABLET, FILM COATED ORAL NIGHTLY
Qty: 30 TABLET | Refills: 1 | Status: SHIPPED | OUTPATIENT
Start: 2022-04-04

## 2022-04-04 RX ORDER — LISINOPRIL 5 MG/1
5 TABLET ORAL DAILY
Qty: 30 TABLET | Refills: 1 | Status: SHIPPED | OUTPATIENT
Start: 2022-04-05

## 2022-04-04 RX ORDER — TRAZODONE HYDROCHLORIDE 150 MG/1
75 TABLET ORAL NIGHTLY PRN
Qty: 15 TABLET | Refills: 1 | Status: SHIPPED | OUTPATIENT
Start: 2022-04-04

## 2022-04-04 RX ADMIN — LISINOPRIL 5 MG: 5 TABLET ORAL at 09:19

## 2022-04-04 NOTE — DISCHARGE SUMMARY
DISCHARGE SUMMARY      Patient ID:  Mikaela Logan  [id number]  19 y.o.  2000      Admit date: 3/29/2022    Discharge date and time: 4/4/2022    Admitting Physician: Margarito Slade MD     Discharge Physician: Dr Karolina Mims MD    Admission Diagnoses: Major depressive disorder with current active episode, unspecified depression episode severity, unspecified whether recurrent [F32.9]  Major depression, recurrent ([address] [F33.9]    Admission Condition: poor    Discharged Condition: stable    Admission Circumstance:     Patient came in with complaints of worsening depression and SI.  Patient stated that the depression started 5 years ago with his grandmothers death.  This coincided with his first thoughts of SI.  Patient then lost his grandfather 4 years later, which has made his depression worse and SI intensify.  Within the last couple days, he has been having SI thoughts that are intensified, no plan.  He denies any previous inpatient psych admissions. Virginia Richter denies being on any psych medications in the past.  Does say that he was taking something for sleep.  Denies any drug or alcohol use outside of marijuana which he says helps him think. Virginia Richter is appropriate with eye contact.      HISTORY OF PRESENT ILLNESS:       The patient is a 19 y.o. male single live with parents unemployed with no significant past history MDD  Never been treated for depression  Pt has been feeling depressed for the past 5 years  Stressors:Grandmother passed away 5 years ago  Grandfather passed away 1 year ago  Lived with a friend for awhile but has since moved back in with his parents  Doesn't feel like he has any support system  Duration:  Severity: Rating mood to be around 2/10 (10- good)  Quality:melancholic  Worse all day  Content: Hopeless, worthless and helpless feeling  Suicidal thoughts - cutting his wrist or strangle self to death  I wish I am not alive.  Feeling safe here  Associated symptoms:  Poor concentration, anhedonia, decrease motivation  Sleep and appetite- poor     The patient is not currently receiving care for the above psychiatric illness.     Medications Prior to Admission:   Prescriptions Prior to Admission   No medications prior to admission.        Compliance:no     Psychiatric Review of Systems       Depression: yes     Juliet or Hypomania:  no     Panic Attacks:  yes      Phobias:  no     Obsessions and Compulsions:  no     PTSD : no     Hallucinations:  no     Delusions:  no     Substance Abuse History:  ETOH: no   Marijuana: no  Opiates: no  Other Drugs: no        Past Psychiatric History:  Prior Diagnosis:  MDD  Psychiatrist: no  Therapist:no  Hospitalization: no  Hx of Suicidal Attempts: tried to cut in the past  Hx of violence:  no  ECT: no  Previous discontinued Psychiatric Med Trials: no           PAST MEDICAL/PSYCHIATRIC HISTORY:   History reviewed. No pertinent past medical history. FAMILY/SOCIAL HISTORY:  History reviewed. No pertinent family history. Social History     Socioeconomic History    Marital status: Single     Spouse name: Not on file    Number of children: Not on file    Years of education: Not on file    Highest education level: Not on file   Occupational History    Not on file   Tobacco Use    Smoking status: Current Every Day Smoker     Types: Cigarettes    Smokeless tobacco: Never Used   Vaping Use    Vaping Use: Never used   Substance and Sexual Activity    Alcohol use: Not on file     Comment: social    Drug use: Yes     Types: Marijuana Nicolle Beat)    Sexual activity: Not on file   Other Topics Concern    Not on file   Social History Narrative    Not on file     Social Determinants of Health     Financial Resource Strain:     Difficulty of Paying Living Expenses: Not on file   Food Insecurity:     Worried About Running Out of Food in the Last Year: Not on file    Adrian of Food in the Last Year: Not on file   Transportation Needs:     Lack of Transportation (Medical):  Not on file    Lack of Transportation (Non-Medical):  Not on file   Physical Activity:     Days of Exercise per Week: Not on file    Minutes of Exercise per Session: Not on file   Stress:     Feeling of Stress : Not on file   Social Connections:     Frequency of Communication with Friends and Family: Not on file    Frequency of Social Gatherings with Friends and Family: Not on file    Attends Mu-ism Services: Not on file    Active Member of [address] [place] [address] or Organizations: Not on file    Attends Club or Organization Meetings: Not on file    Marital Status: Not on file   Intimate Partner Violence:     Fear of Current or Ex-Partner: Not on file    Emotionally Abused: Not on file    Physically Abused: Not on file    Sexually Abused: Not on file   Housing Stability:     Unable to Pay for Housing in the Last Year: Not on file    Number of [place] in the Last Year: Not on file    Unstable Housing in the Last Year: Not on file       MEDICATIONS:    Current Facility-Administered Medications:     traZODone (DESYREL) tablet 75 mg, 75 mg, Oral, Nightly PRN, Miguel Beatty MD    lisinopril (PRINIVIL;ZESTRIL) tablet 5 mg, 5 mg, Oral, Daily, MANUEL Wright - NP, 5 mg at 04/04/22 0919    mirtazapine (REMERON) tablet 15 mg, 15 mg, Oral, Nightly, Miguel Beatty MD, 15 mg at 04/03/22 2123    acetaminophen (TYLENOL) tablet 650 mg, 650 mg, Oral, Q4H PRN, Amanda Flores MD    magnesium hydroxide (MILK OF MAGNESIA) 400 MG/5ML suspension 30 mL, 30 mL, Oral, Daily PRN, Amanda Flores MD    aluminum & magnesium hydroxide-simethicone (MAALOX) 200-200-20 MG/5ML suspension 30 mL, 30 mL, Oral, PRN, Amanda Flores MD    haloperidol (HALDOL) tablet 5 mg, 5 mg, Oral, Q6H PRN **OR** haloperidol lactate (HALDOL) injection 5 mg, 5 mg, IntraMUSCular, Q6H PRN, Amanda Flores MD    benztropine mesylate (COGENTIN) injection 2 mg, 2 mg, IntraMUSCular, BID PRN, Amanda Flores MD    hydrOXYzine (VISTARIL) injection 50 mg, 50 mg, IntraMUSCular, Q6H PRN **OR** hydrOXYzine (VISTARIL) capsule 50 mg, 50 mg, Oral, Q6H PRN, Winifred Bello MD, 50 mg at 03/30/22 0048    Examination:  BP (!) 143/80   Pulse 111   Temp 98.1 °F (36.7 °C)   Resp 15   Ht 5' 6\" (1.676 m)   Wt 130 lb (59 kg)   SpO2 100%   BMI 20.98 kg/m²   Gait - steady    HOSPITAL COURSE[de-identified]  Following admission to the hospital, patient had a complete physical exam and blood work up  Patient was monitored closely with suicide precaution  Patient was started on meds as listed below  Was encouraged to participate in group and other milieu activity  Patient started to feel better with this combination of treatment. Significant progress in the symptoms since admission. Mood better, with the score of 2/10 - bad  No AVH or paranoid thoughts  No Hopeless or worthless feeling  No active SI/HI  Appetite:  [x] Normal  [] Increased  [] Decreased    Sleep:       [x] Normal  [] Fair       [] Poor            Energy:    [x] Normal  [] Increased  [] Decreased     SI [] Present  [x] Absent  HI  []Present  [x] Absent   Aggression:  [] yes  [] no  Patient is [x] able  [] unable to CONTRACT FOR SAFETY   Medication side effects(SE):  [x] None(Psych.  Meds.) [] Other      Mental Status Examination on discharge:    Level of consciousness:  within normal limits   Appearance:  well-appearing  Behavior/Motor:  no abnormalities noted  Attitude toward examiner:  attentive and good eye contact  Speech:  spontaneous, normal rate and normal volume   Mood: euthymic  Affect:  mood congruent  Thought processes:  goal directed   Thought content:  Suicidal Ideation:  denies suicidal ideation  Cognition:  oriented to person, place, and time   Concentration intact  Memory intact  Insight good   Judgement fair   Fund of Knowledge adequate      ASSESSMENT:  Patient symptoms are:  [x] Well controlled  [x] Improving  [] Worsening  [] No change      Diagnosis:  Principal Problem:    Major depression, recurrent ([facility]  Resolved Problems:    * No resolved hospital problems. *      LABS:    No results for input(s): WBC, HGB, PLT in the last 72 hours. No results for input(s): NA, K, CL, CO2, BUN, CREATININE, GLUCOSE in the last 72 hours. No results for input(s): BILITOT, ALKPHOS, AST, ALT in the last 72 hours. Lab Results   Component Value Date    LABAMPH Neg 03/29/2022    BARBSCNU Neg 03/29/2022    LABBENZ Neg 03/29/2022    LABMETH Neg 03/29/2022    OPIATESCREENURINE Neg 03/29/2022    PHENCYCLIDINESCREENURINE Neg 03/29/2022    ETOH <10 03/29/2022     Lab Results   Component Value Date    TSH 2.120 03/29/2022     No results found for: LITHIUM  Lab Results   Component Value Date    VALPROATE <2.8 (L) 03/29/2022       RISK ASSESSMENT AT DISCHARGE: Low risk for suicide and homicide. Treatment Plan:  Reviewed current Medications with the patient. Education provided on the complaince with treatment. Risks, benefits, side effects, drug-to-drug interactions and alternatives to treatment were discussed. Encourage patient to attend outpatient follow up appointment and therapy. Patient was advised to call the outpatient provider, visit the nearest ED or call 911 if symptoms are not manageable. Patient's family member was contacted prior to the discharge.          Medication List      START taking these medications    lisinopril 5 MG tablet  Commonly known as: PRINIVIL;ZESTRIL  Take 1 tablet by mouth daily  Start taking on: April 5, 2022     traZODone 150 MG tablet  Commonly known as: DESYREL  Take 0.5 tablets by mouth nightly as needed for Sleep        CHANGE how you take these medications    mirtazapine 15 MG tablet  Commonly known as: REMERON  Take 1 tablet by mouth nightly  What changed: when to take this        CONTINUE taking these medications    vitamin D 1.25 MG (91923 UT) Caps capsule  Commonly known as: ERGOCALCIFEROL           Where to Get Your Medications      These medications were sent to [address], [address] [place] [address]    Phone: [phone]   · lisinopril 5 MG tablet  · mirtazapine 15 MG tablet  · traZODone 150 MG tablet           Reason for more than one antipsychotic:   [x] N/A  [] 3 failed monotherapy(drugs tried):  [] Cross over to a new antipsychotic  [] Taper to monotherapy from polypharmacy  [] Augmentation of Clozapine therapy due to treatment resistance to single therapy        TIME SPEND - 35 MINUTES TO COMPLETE THE EVALUATION, DISCHARGE SUMMARY, MEDICATION RECONCILIATION AND FOLLOW UP CARE     Signed:  Erwin Wesley MD  4/4/2022  9:35 AM

## 2022-04-04 NOTE — PROGRESS NOTES
Department of Psychiatry  Attending Progress Note      SUBJECTIVE:  Patient was admitted in a very depressed state, depression that had started 5 years ago after grandmothers death  In the last few days prior to admission, he had streaming intense suicidal thoughts  Now reports feeling lighter , like a whole burden has fallen off his shoulders  OBJECTIVE  Pleasant, going to groups visible   Physical  VITALS:  /89   Pulse 112   Temp 98.1 °F (36.7 °C)   Resp 15   Ht 5' 6\" (1.676 m)   Wt 130 lb (59 kg)   SpO2 100%   BMI 20.98 kg/m²     Mental Status Examination:  Level of consciousness: awake and alert  Appearance:  well-appearing  Behavior/Motor:  Psychomotor tone WNL  Attitude toward examiner:  cooperative  Speech:  spontaneous, normal rate, normal volume and well articulated  Mood:  euthymic  Affect:  mood congruent  Thought processes:  goal directed  Thought content:     Homocidal ideation denies   Suicidal Ideation:denies   Delusions: none elicited  Perceptual Disturbance:  denies auditory, visual, olfactory or tactile hallucinations  Cognition:  oriented to person, place, and time  Memory intact  Immediate recall intact  Mildly distractible  Insight good  Judgement good  . Data  Labs:    CBC with Differential:    Lab Results   Component Value Date    WBC 4.9 03/29/2022    RBC 5.65 03/29/2022    HGB 16.0 03/29/2022    HCT 48.7 03/29/2022     03/29/2022    MCV 86.1 03/29/2022    MCH 28.3 03/29/2022    MCHC 32.8 03/29/2022    RDW 13.5 03/29/2022    LYMPHOPCT 24.8 03/29/2022    MONOPCT 9.1 03/29/2022    BASOPCT 0.3 03/29/2022    MONOSABS 0.4 03/29/2022    LYMPHSABS 1.2 03/29/2022    EOSABS 0.0 03/29/2022    BASOSABS 0.0 03/29/2022     CMP:    Lab Results   Component Value Date     03/29/2022    K 3.9 03/29/2022     03/29/2022    CO2 23 03/29/2022    BUN 12 03/29/2022    CREATININE 1.03 03/29/2022    GFRAA >60.0 03/29/2022    LABGLOM >60.0 03/29/2022    GLUCOSE 122 03/29/2022    PROT 7.5 03/29/2022    LABALBU 5.1 03/29/2022    CALCIUM 9.3 03/29/2022    BILITOT 0.4 03/29/2022    ALKPHOS 75 03/29/2022    AST 16 03/29/2022    ALT 15 03/29/2022     Magnesium:  No results found for: MG  TSH:    Lab Results   Component Value Date    TSH 2.120 03/29/2022     VITAMIN B12: No components found for: B12  FOLATE:  No results found for: FOLATE  Urine Toxicology:  No components found for: IAMMENTA, IBARBIT, IBENZO, ICOCAINE, IMARTHC, IOPIATES, IPHENCYC    Medications  Current Facility-Administered Medications: lisinopril (PRINIVIL;ZESTRIL) tablet 5 mg, 5 mg, Oral, Daily  mirtazapine (REMERON) tablet 15 mg, 15 mg, Oral, Nightly  acetaminophen (TYLENOL) tablet 650 mg, 650 mg, Oral, Q4H PRN  magnesium hydroxide (MILK OF MAGNESIA) 400 MG/5ML suspension 30 mL, 30 mL, Oral, Daily PRN  aluminum & magnesium hydroxide-simethicone (MAALOX) 200-200-20 MG/5ML suspension 30 mL, 30 mL, Oral, PRN  haloperidol (HALDOL) tablet 5 mg, 5 mg, Oral, Q6H PRN **OR** haloperidol lactate (HALDOL) injection 5 mg, 5 mg, IntraMUSCular, Q6H PRN  benztropine mesylate (COGENTIN) injection 2 mg, 2 mg, IntraMUSCular, BID PRN  traZODone (DESYREL) tablet 50 mg, 50 mg, Oral, Nightly PRN  hydrOXYzine (VISTARIL) injection 50 mg, 50 mg, IntraMUSCular, Q6H PRN **OR** hydrOXYzine (VISTARIL) capsule 50 mg, 50 mg, Oral, Q6H PRN    ASSESSMENT AND PLAN    Dx: MDD severe recurrent without psychotic features  Plan: cont treatment. Latrice Knight MD

## 2022-04-04 NOTE — PROGRESS NOTES
Pt. refused to attend the 1000 skills group, despite staff encouragement.  Electronically signed by Hayley Perea on 4/4/2022 at 1:12 PM

## 2022-04-04 NOTE — DISCHARGE INSTR - DIET

## 2022-04-04 NOTE — PROGRESS NOTES
CLINICAL PHARMACY NOTE: MEDS TO BEDS    Total # of Prescriptions Filled: 3   The following medications were delivered to the patient:  · Lisinopril 5 mg tab  · Trazodone 150 mg tab  · Mirtazapine 15 mg tab    Additional Documentation:

## 2022-04-04 NOTE — FLOWSHEET NOTE
Reviewed discharge instructions with patient. Pt. Verbalized understanding. Denies SI/HI/AVH. Ambulatory off unit to ride.

## 2022-04-04 NOTE — GROUP NOTE
Group Therapy Note    Date: 4/3/2022    Group Start Time: 2130  Group End Time: 2140  Group Topic: Wrap-Up    [facility]    Karina Truong        Group Therapy Note    Attendees: 6/19         Patient's Goal:  \"get through the day\"    Notes:  Patient reported meeting their goal for the day. Patient shared having fun playing rummy with peers.     Status After Intervention:  Unchanged    Participation Level: Interactive    Participation Quality: Appropriate, Attentive and Sharing      Speech:  normal    Thought Process/Content: Logical      Affective Functioning: Congruent      Mood: euthymic      Level of consciousness:  Alert and Attentive      Response to Learning: Progressing to goal      Endings: None Reported    Modes of Intervention: Support      Discipline Responsible: [employer]      Signature:  Karina Truong

## 2022-04-04 NOTE — PROGRESS NOTES
Morning [address] attended the morning community meeting on 4/4/22. Topics discussed today     [x] Introduction   Day of the week and date   Mask distribution   Current mask requirements  [x]Teams   Explanation of  Green and Blue team criteria   Nurses assigned to each team for today   Explanation about green and blue paper  o Date  o Patient's Name  o Patient's Nurse  o Goals  [x] Visitation   Announce the visiting hours for the day   Announce which team is allowed to have visitors for the day   Review any updated Covid 19 requirements for visitors during visitation  o Vaccine Card or negative Covid test within 48 hours of visit  o State Identification   Patients are reminded to alert the  at least 1 hour before visitation   [x] Unit Orientation   Coffee use   Phone location and etiquette   Shower locations  [employer] and dryer location and process   Common area expectations   Staff rounds expectation  [x] Meals    Educate patient to the menu  o The patient is encouraged to fill out the menu to get preferences at mealtime  o The patient is educated that if they do not fill out the menu, they will get the standard tray  o The coffee pot is decaf, patient encouraged to order regular coffee from menu.    Educate patient to the meal process   Patient encouraged to eat snacks provided twice daily  o Snacks may stay in patient room     [x] Discharge Process   Discharge expectations   Fill out the survey after discharge   [x] Hygiene   Daily showers encouraged  o Showers availability discussed    Daily dressing encouraged  o Discussed wearing street clothing   Education provided on where to place linens and clothing  o Linens in the hamper  o personal clothing does not go into the linen hamper  [x] Group    Patient encouraged to attend group provided   Time of Group Meetings discussed   Gentle reminder that attendance is a Physician order  [x] Movement   Chair exercises completed   Stretching completed  Notes: GOAL : \" to go ho me and see my family\" Electronically signed by Prashant Mckinnon on 4/4/2022 at 9:44 AM

## 2022-04-04 NOTE — GROUP NOTE
Group Therapy Note    Date: 4/3/2022    Group Start Time: 2005  Group End Time: 2045  Group Topic: Recreational    [facility]    Jamgo        Group Therapy Note    Attendees: 11/19         Patient's Goal:  To watch a movie and enjoy a snack. Notes:  Patient participated in activity group.      Status After Intervention:  Unchanged    Participation Level: Interactive    Participation Quality: Appropriate and Attentive      Speech:  normal      Thought Process/Content: Logical      Affective Functioning: Congruent      Mood: euthymic      Level of consciousness:  Alert and Attentive      Response to Learning: Progressing to goal      Endings: None Reported    Modes of Intervention: Activity      Discipline Responsible: [address], [facility]      Signature:  Jamgo

## 2022-04-05 NOTE — PROGRESS NOTES
Department of Psychiatry  Attending Progress Note      SUBJECTIVE:  Patient is a 18 yo male admitted with severe suicidal ideations in a seting of worsening depression. Patient had been very close to grandmother who passed away a few years ago and lost his grandfather a year ago  Patient report feeling better  States he had no energy when he first came in but now he feels better  States SI are decreasing  OBJECTIVE  Patient is out in the [facility] w/other peers  Physical  VITALS:  BP (!) 143/80   Pulse 111   Temp 98.1 °F (36.7 °C)   Resp 15   Ht 5' 6\" (1.676 m)   Wt 130 lb (59 kg)   SpO2 100%   BMI 20.98 kg/m²   CONSTITUTIONAL:  awake, alert, cooperative, no apparent distress, and appears stated age  . Mental Status Examination:  Level of consciousness: awake and alert  Appearance:  well-appearing  Behavior/Motor:  Psychomotor tone WNL  Attitude toward examiner:  cooperative  Speech:  spontaneous, normal rate, normal volume and well articulated  Mood:  Euthymic  Affect:  mood congruent  Thought processes:  goal directed  Thought content:  Homocidal ideation denies  Suicidal Ideation:  Decreasing  Delusions:  None elicited  Perceptual Disturbance:  denies any perceptual disturbance  Cognition:  oriented to person, place, and time  Memory intact  Mildly distractible  Insight fair  Judgement fair    Data  Labs:    CBC with Differential:    Lab Results   Component Value Date    WBC 4.9 03/29/2022    RBC 5.65 03/29/2022    HGB 16.0 03/29/2022    HCT 48.7 03/29/2022     03/29/2022    MCV 86.1 03/29/2022    MCH 28.3 03/29/2022    MCHC 32.8 03/29/2022    RDW 13.5 03/29/2022    LYMPHOPCT 24.8 03/29/2022    MONOPCT 9.1 03/29/2022    BASOPCT 0.3 03/29/2022    MONOSABS 0.4 03/29/2022    LYMPHSABS 1.2 03/29/2022    EOSABS 0.0 03/29/2022    BASOSABS 0.0 03/29/2022     Hemoglobin/Hematocrit:    Lab Results   Component Value Date    HGB 16.0 03/29/2022    HCT 48.7 03/29/2022     CMP:    Lab Results   Component Value Date

## 2023-05-09 ENCOUNTER — HOSPITAL ENCOUNTER (INPATIENT)
Age: 23
LOS: 8 days | Discharge: HOME | DRG: 751 | End: 2023-05-17
Attending: EMERGENCY MEDICINE | Admitting: PSYCHIATRY & NEUROLOGY
Payer: COMMERCIAL

## 2023-05-09 DIAGNOSIS — F29 PSYCHOSIS, UNSPECIFIED PSYCHOSIS TYPE (HCC): ICD-10-CM

## 2023-05-09 DIAGNOSIS — R45.851 SUICIDAL IDEATION: Primary | ICD-10-CM

## 2023-05-09 LAB
ALBUMIN SERPL-MCNC: 5 G/DL (ref 3.5–4.6)
ALP SERPL-CCNC: 71 U/L (ref 35–104)
ALT SERPL-CCNC: 10 U/L (ref 0–41)
AMPHET UR QL SCN: ABNORMAL
ANION GAP SERPL CALCULATED.3IONS-SCNC: 11 MEQ/L (ref 9–15)
APAP SERPL-MCNC: <5 UG/ML (ref 10–30)
AST SERPL-CCNC: 13 U/L (ref 0–40)
BARBITURATES UR QL SCN: ABNORMAL
BASOPHILS # BLD: 0 K/UL (ref 0–0.2)
BASOPHILS NFR BLD: 0.3 %
BENZODIAZ UR QL SCN: ABNORMAL
BILIRUB SERPL-MCNC: 0.4 MG/DL (ref 0.2–0.7)
BILIRUB UR QL STRIP: NEGATIVE
BUN SERPL-MCNC: 10 MG/DL (ref 6–20)
CALCIUM SERPL-MCNC: 9.8 MG/DL (ref 8.5–9.9)
CANNABINOIDS UR QL SCN: POSITIVE
CHLORIDE SERPL-SCNC: 102 MEQ/L (ref 95–107)
CHOLEST SERPL-MCNC: 98 MG/DL (ref 0–199)
CK SERPL-CCNC: 123 U/L (ref 0–190)
CLARITY UR: CLEAR
CO2 SERPL-SCNC: 26 MEQ/L (ref 20–31)
COCAINE UR QL SCN: ABNORMAL
COLOR UR: YELLOW
CREAT SERPL-MCNC: 1.18 MG/DL (ref 0.7–1.2)
DRUG SCREEN COMMENT UR-IMP: ABNORMAL
EOSINOPHIL # BLD: 0 K/UL (ref 0–0.7)
EOSINOPHIL NFR BLD: 0.4 %
ERYTHROCYTE [DISTWIDTH] IN BLOOD BY AUTOMATED COUNT: 13.2 % (ref 11.5–14.5)
ETHANOL PERCENT: NORMAL G/DL
ETHANOLAMINE SERPL-MCNC: <10 MG/DL (ref 0–0.08)
FENTANYL SCREEN, URINE: ABNORMAL
GLOBULIN SER CALC-MCNC: 2.3 G/DL (ref 2.3–3.5)
GLUCOSE SERPL-MCNC: 110 MG/DL (ref 70–99)
GLUCOSE UR STRIP-MCNC: NEGATIVE MG/DL
HCT VFR BLD AUTO: 45.8 % (ref 42–52)
HDLC SERPL-MCNC: 32 MG/DL (ref 40–59)
HGB BLD-MCNC: 15.4 G/DL (ref 14–18)
HGB UR QL STRIP: NEGATIVE
KETONES UR STRIP-MCNC: NEGATIVE MG/DL
LDLC SERPL CALC-MCNC: 51 MG/DL (ref 0–129)
LEUKOCYTE ESTERASE UR QL STRIP: NEGATIVE
LYMPHOCYTES # BLD: 1 K/UL (ref 1–4.8)
LYMPHOCYTES NFR BLD: 15.4 %
MCH RBC QN AUTO: 29.3 PG (ref 27–31.3)
MCHC RBC AUTO-ENTMCNC: 33.7 % (ref 33–37)
MCV RBC AUTO: 87.1 FL (ref 79–92.2)
METHADONE UR QL SCN: ABNORMAL
MONOCYTES # BLD: 0.6 K/UL (ref 0.2–0.8)
MONOCYTES NFR BLD: 8.4 %
NEUTROPHILS # BLD: 5 K/UL (ref 1.4–6.5)
NEUTS SEG NFR BLD: 75.5 %
NITRITE UR QL STRIP: NEGATIVE
OPIATES UR QL SCN: ABNORMAL
OXYCODONE UR QL SCN: ABNORMAL
PCP UR QL SCN: ABNORMAL
PH UR STRIP: 7 [PH] (ref 5–9)
PLATELET # BLD AUTO: 108 K/UL (ref 130–400)
PLATELET BLD QL SMEAR: ADEQUATE
POTASSIUM SERPL-SCNC: 5 MEQ/L (ref 3.4–4.9)
PROPOXYPH UR QL SCN: ABNORMAL
PROT SERPL-MCNC: 7.3 G/DL (ref 6.3–8)
PROT UR STRIP-MCNC: NEGATIVE MG/DL
RBC # BLD AUTO: 5.26 M/UL (ref 4.7–6.1)
SALICYLATES SERPL-MCNC: <0.3 MG/DL (ref 15–30)
SODIUM SERPL-SCNC: 139 MEQ/L (ref 135–144)
SP GR UR STRIP: 1.01 (ref 1–1.03)
TRIGL SERPL-MCNC: 77 MG/DL (ref 0–150)
TSH SERPL-MCNC: 3 UIU/ML (ref 0.44–3.86)
URINE REFLEX TO CULTURE: NORMAL
UROBILINOGEN UR STRIP-ACNC: 0.2 E.U./DL
WBC # BLD AUTO: 6.7 K/UL (ref 4.8–10.8)

## 2023-05-09 PROCEDURE — 36415 COLL VENOUS BLD VENIPUNCTURE: CPT

## 2023-05-09 PROCEDURE — 81003 URINALYSIS AUTO W/O SCOPE: CPT

## 2023-05-09 PROCEDURE — 80307 DRUG TEST PRSMV CHEM ANLYZR: CPT

## 2023-05-09 PROCEDURE — 85025 COMPLETE CBC W/AUTO DIFF WBC: CPT

## 2023-05-09 PROCEDURE — 99285 EMERGENCY DEPT VISIT HI MDM: CPT

## 2023-05-09 PROCEDURE — 80061 LIPID PANEL: CPT

## 2023-05-09 PROCEDURE — 84443 ASSAY THYROID STIM HORMONE: CPT

## 2023-05-09 PROCEDURE — 80053 COMPREHEN METABOLIC PANEL: CPT

## 2023-05-09 PROCEDURE — 80179 DRUG ASSAY SALICYLATE: CPT

## 2023-05-09 PROCEDURE — 82550 ASSAY OF CK (CPK): CPT

## 2023-05-09 PROCEDURE — 1240000000 HC EMOTIONAL WELLNESS R&B

## 2023-05-09 PROCEDURE — 80143 DRUG ASSAY ACETAMINOPHEN: CPT

## 2023-05-09 PROCEDURE — 82077 ASSAY SPEC XCP UR&BREATH IA: CPT

## 2023-05-09 PROCEDURE — 6370000000 HC RX 637 (ALT 250 FOR IP): Performed by: PSYCHIATRY & NEUROLOGY

## 2023-05-09 RX ORDER — HYDROXYZINE HYDROCHLORIDE 50 MG/ML
50 INJECTION, SOLUTION INTRAMUSCULAR EVERY 6 HOURS PRN
Status: DISCONTINUED | OUTPATIENT
Start: 2023-05-09 | End: 2023-05-17 | Stop reason: HOSPADM

## 2023-05-09 RX ORDER — HYDROXYZINE PAMOATE 50 MG/1
50 CAPSULE ORAL EVERY 6 HOURS PRN
Status: DISCONTINUED | OUTPATIENT
Start: 2023-05-09 | End: 2023-05-17 | Stop reason: HOSPADM

## 2023-05-09 RX ORDER — TRAZODONE HYDROCHLORIDE 50 MG/1
50 TABLET ORAL NIGHTLY PRN
Status: DISCONTINUED | OUTPATIENT
Start: 2023-05-09 | End: 2023-05-17 | Stop reason: HOSPADM

## 2023-05-09 RX ORDER — BENZTROPINE MESYLATE 1 MG/ML
2 INJECTION INTRAMUSCULAR; INTRAVENOUS 2 TIMES DAILY PRN
Status: DISCONTINUED | OUTPATIENT
Start: 2023-05-09 | End: 2023-05-17 | Stop reason: HOSPADM

## 2023-05-09 RX ORDER — HALOPERIDOL 5 MG/1
5 TABLET ORAL EVERY 6 HOURS PRN
Status: DISCONTINUED | OUTPATIENT
Start: 2023-05-09 | End: 2023-05-17 | Stop reason: HOSPADM

## 2023-05-09 RX ORDER — MAGNESIUM HYDROXIDE/ALUMINUM HYDROXICE/SIMETHICONE 120; 1200; 1200 MG/30ML; MG/30ML; MG/30ML
30 SUSPENSION ORAL PRN
Status: DISCONTINUED | OUTPATIENT
Start: 2023-05-09 | End: 2023-05-17 | Stop reason: HOSPADM

## 2023-05-09 RX ORDER — ACETAMINOPHEN 325 MG/1
650 TABLET ORAL EVERY 4 HOURS PRN
Status: DISCONTINUED | OUTPATIENT
Start: 2023-05-09 | End: 2023-05-17 | Stop reason: HOSPADM

## 2023-05-09 RX ORDER — QUETIAPINE FUMARATE 100 MG/1
150 TABLET, FILM COATED ORAL NIGHTLY
Status: ON HOLD | COMMUNITY
End: 2023-05-17 | Stop reason: HOSPADM

## 2023-05-09 RX ORDER — HALOPERIDOL 5 MG/ML
5 INJECTION INTRAMUSCULAR EVERY 6 HOURS PRN
Status: DISCONTINUED | OUTPATIENT
Start: 2023-05-09 | End: 2023-05-17 | Stop reason: HOSPADM

## 2023-05-09 RX ADMIN — QUETIAPINE FUMARATE 150 MG: 100 TABLET ORAL at 22:55

## 2023-05-09 ASSESSMENT — PAIN - FUNCTIONAL ASSESSMENT: PAIN_FUNCTIONAL_ASSESSMENT: NONE - DENIES PAIN

## 2023-05-09 ASSESSMENT — LIFESTYLE VARIABLES
HOW OFTEN DO YOU HAVE A DRINK CONTAINING ALCOHOL: NEVER
HOW MANY STANDARD DRINKS CONTAINING ALCOHOL DO YOU HAVE ON A TYPICAL DAY: PATIENT DOES NOT DRINK
HOW OFTEN DO YOU HAVE A DRINK CONTAINING ALCOHOL: NEVER

## 2023-05-09 ASSESSMENT — SLEEP AND FATIGUE QUESTIONNAIRES
SLEEP PATTERN: INSOMNIA
AVERAGE NUMBER OF SLEEP HOURS: 3
DO YOU USE A SLEEP AID: YES
DO YOU HAVE DIFFICULTY SLEEPING: YES

## 2023-05-09 ASSESSMENT — PATIENT HEALTH QUESTIONNAIRE - PHQ9: SUM OF ALL RESPONSES TO PHQ QUESTIONS 1-9: 27

## 2023-05-09 ASSESSMENT — ENCOUNTER SYMPTOMS
RESPIRATORY NEGATIVE: 1
GASTROINTESTINAL NEGATIVE: 1
EYES NEGATIVE: 1
ALLERGIC/IMMUNOLOGIC NEGATIVE: 1

## 2023-05-10 PROBLEM — F33.3 SEVERE EPISODE OF RECURRENT MAJOR DEPRESSIVE DISORDER, WITH PSYCHOTIC FEATURES (HCC): Status: ACTIVE | Noted: 2023-05-09

## 2023-05-10 LAB
ANION GAP SERPL CALCULATED.3IONS-SCNC: 10 MEQ/L (ref 9–15)
BUN SERPL-MCNC: 14 MG/DL (ref 6–20)
CALCIUM SERPL-MCNC: 9.9 MG/DL (ref 8.5–9.9)
CHLORIDE SERPL-SCNC: 101 MEQ/L (ref 95–107)
CO2 SERPL-SCNC: 29 MEQ/L (ref 20–31)
CREAT SERPL-MCNC: 1.19 MG/DL (ref 0.7–1.2)
GLUCOSE SERPL-MCNC: 112 MG/DL (ref 70–99)
POTASSIUM SERPL-SCNC: 4.6 MEQ/L (ref 3.4–4.9)
SODIUM SERPL-SCNC: 140 MEQ/L (ref 135–144)

## 2023-05-10 PROCEDURE — 36415 COLL VENOUS BLD VENIPUNCTURE: CPT

## 2023-05-10 PROCEDURE — 1240000000 HC EMOTIONAL WELLNESS R&B

## 2023-05-10 PROCEDURE — 6370000000 HC RX 637 (ALT 250 FOR IP): Performed by: PSYCHIATRY & NEUROLOGY

## 2023-05-10 PROCEDURE — 80048 BASIC METABOLIC PNL TOTAL CA: CPT

## 2023-05-10 RX ADMIN — QUETIAPINE FUMARATE 150 MG: 100 TABLET ORAL at 22:08

## 2023-05-10 RX ADMIN — VORTIOXETINE 10 MG: 10 TABLET, FILM COATED ORAL at 11:30

## 2023-05-11 PROCEDURE — 6370000000 HC RX 637 (ALT 250 FOR IP): Performed by: PSYCHIATRY & NEUROLOGY

## 2023-05-11 PROCEDURE — 1240000000 HC EMOTIONAL WELLNESS R&B

## 2023-05-11 RX ADMIN — QUETIAPINE FUMARATE 150 MG: 100 TABLET ORAL at 21:09

## 2023-05-11 RX ADMIN — VORTIOXETINE 10 MG: 10 TABLET, FILM COATED ORAL at 08:52

## 2023-05-12 PROCEDURE — 6370000000 HC RX 637 (ALT 250 FOR IP): Performed by: PSYCHIATRY & NEUROLOGY

## 2023-05-12 PROCEDURE — 1240000000 HC EMOTIONAL WELLNESS R&B

## 2023-05-12 RX ORDER — ONDANSETRON 4 MG/1
4 TABLET, ORALLY DISINTEGRATING ORAL EVERY 8 HOURS PRN
Status: DISCONTINUED | OUTPATIENT
Start: 2023-05-12 | End: 2023-05-17 | Stop reason: HOSPADM

## 2023-05-12 RX ADMIN — QUETIAPINE FUMARATE 150 MG: 100 TABLET ORAL at 21:05

## 2023-05-12 RX ADMIN — VORTIOXETINE 10 MG: 10 TABLET, FILM COATED ORAL at 09:32

## 2023-05-13 PROCEDURE — 1240000000 HC EMOTIONAL WELLNESS R&B

## 2023-05-13 PROCEDURE — 6370000000 HC RX 637 (ALT 250 FOR IP): Performed by: PSYCHIATRY & NEUROLOGY

## 2023-05-13 PROCEDURE — 6370000000 HC RX 637 (ALT 250 FOR IP): Performed by: REGISTERED NURSE

## 2023-05-13 RX ADMIN — ONDANSETRON 4 MG: 4 TABLET, ORALLY DISINTEGRATING ORAL at 08:35

## 2023-05-13 RX ADMIN — VORTIOXETINE 10 MG: 10 TABLET, FILM COATED ORAL at 08:35

## 2023-05-13 RX ADMIN — QUETIAPINE FUMARATE 150 MG: 100 TABLET ORAL at 21:44

## 2023-05-14 PROCEDURE — 1240000000 HC EMOTIONAL WELLNESS R&B

## 2023-05-14 PROCEDURE — 6370000000 HC RX 637 (ALT 250 FOR IP): Performed by: PSYCHIATRY & NEUROLOGY

## 2023-05-14 PROCEDURE — 6370000000 HC RX 637 (ALT 250 FOR IP): Performed by: REGISTERED NURSE

## 2023-05-14 RX ADMIN — ONDANSETRON 4 MG: 4 TABLET, ORALLY DISINTEGRATING ORAL at 08:25

## 2023-05-14 RX ADMIN — QUETIAPINE FUMARATE 150 MG: 100 TABLET ORAL at 21:05

## 2023-05-14 RX ADMIN — VORTIOXETINE 10 MG: 10 TABLET, FILM COATED ORAL at 08:25

## 2023-05-15 LAB
ANION GAP SERPL CALCULATED.3IONS-SCNC: 13 MEQ/L (ref 9–15)
BUN SERPL-MCNC: 17 MG/DL (ref 6–20)
CALCIUM SERPL-MCNC: 9.3 MG/DL (ref 8.5–9.9)
CHLORIDE SERPL-SCNC: 104 MEQ/L (ref 95–107)
CO2 SERPL-SCNC: 23 MEQ/L (ref 20–31)
CREAT SERPL-MCNC: 1.24 MG/DL (ref 0.7–1.2)
GLUCOSE BLD-MCNC: 139 MG/DL (ref 70–99)
GLUCOSE SERPL-MCNC: 143 MG/DL (ref 70–99)
LACTATE BLDV-SCNC: 2 MMOL/L (ref 0.5–2.2)
MAGNESIUM SERPL-MCNC: 2.3 MG/DL (ref 1.7–2.4)
PERFORMED ON: ABNORMAL
POTASSIUM SERPL-SCNC: 4.1 MEQ/L (ref 3.4–4.9)
SODIUM SERPL-SCNC: 140 MEQ/L (ref 135–144)
TROPONIN T SERPL-MCNC: <0.01 NG/ML (ref 0–0.01)

## 2023-05-15 PROCEDURE — 84484 ASSAY OF TROPONIN QUANT: CPT

## 2023-05-15 PROCEDURE — 83605 ASSAY OF LACTIC ACID: CPT

## 2023-05-15 PROCEDURE — 6370000000 HC RX 637 (ALT 250 FOR IP): Performed by: PSYCHIATRY & NEUROLOGY

## 2023-05-15 PROCEDURE — 83735 ASSAY OF MAGNESIUM: CPT

## 2023-05-15 PROCEDURE — 1240000000 HC EMOTIONAL WELLNESS R&B

## 2023-05-15 PROCEDURE — 80048 BASIC METABOLIC PNL TOTAL CA: CPT

## 2023-05-15 PROCEDURE — 85025 COMPLETE CBC W/AUTO DIFF WBC: CPT

## 2023-05-15 PROCEDURE — 36415 COLL VENOUS BLD VENIPUNCTURE: CPT

## 2023-05-15 PROCEDURE — 6370000000 HC RX 637 (ALT 250 FOR IP): Performed by: REGISTERED NURSE

## 2023-05-15 RX ORDER — QUETIAPINE FUMARATE 200 MG/1
200 TABLET, FILM COATED ORAL NIGHTLY
Status: DISCONTINUED | OUTPATIENT
Start: 2023-05-15 | End: 2023-05-16

## 2023-05-15 RX ORDER — PROPRANOLOL HYDROCHLORIDE 10 MG/1
10 TABLET ORAL 2 TIMES DAILY
Status: DISCONTINUED | OUTPATIENT
Start: 2023-05-15 | End: 2023-05-15

## 2023-05-15 RX ADMIN — ONDANSETRON 4 MG: 4 TABLET, ORALLY DISINTEGRATING ORAL at 08:41

## 2023-05-15 RX ADMIN — VORTIOXETINE 10 MG: 10 TABLET, FILM COATED ORAL at 08:41

## 2023-05-15 RX ADMIN — PROPRANOLOL HYDROCHLORIDE 10 MG: 10 TABLET ORAL at 14:28

## 2023-05-15 RX ADMIN — PROPRANOLOL HYDROCHLORIDE 10 MG: 10 TABLET ORAL at 20:53

## 2023-05-15 RX ADMIN — QUETIAPINE FUMARATE 200 MG: 200 TABLET ORAL at 20:53

## 2023-05-15 NOTE — GROUP NOTE
Group Therapy Note    Date: 5/15/2023    Group Start Time: 1000  Group End Time: 1100  Group Topic: Psychoeducation    [facility] KIMBERLI Farnsworth, CTRS        Group Therapy Note    Attendees: 12       Patient's Goal:  \"to stay out of my room\"    Notes:  Pt. attended the 1000 skill group. Pt. was attentive and worked on project with interest.     Status After Intervention:  Improved    Participation Level:  Active Listener and Interactive    Participation Quality: Appropriate, Attentive, and Sharing      Speech:  normal      Thought Process/Content: Logical      Affective Functioning: Congruent      Mood:  calm      Level of consciousness:  Alert, Oriented x4, and Attentive      Response to Learning: Progressing to goal      Endings: None Reported    Modes of Intervention: Education, Support, Socialization, and Activity      Discipline Responsible: Psychoeducational Specialist      Signature:  Jeevan Doss

## 2023-05-15 NOTE — CARE COORDINATION
FAMILY COLLATERAL NOTE    Family/Support Name: Goldie Briceño #: [phone]  Relationship to Pt[de-identified] mother     Family/Support contact aware of hospitalization: Yes  Presenting Symptoms/Current Concerns:  Patient had been isolating in his closet. He was staying up all night, sleeping all day, not eating. This had been going on for a few months. Mom reported that she heard the front door close. It was unusual for that time of day so she started looking for patient outside and  chased him down the street and convinced him not to jump off the [employer]. Mom then brought patient to ER  Mom stated that the family is on alert and that they have cameras installed all around the inside and outside of the house. Mom talked to patient today and stated \"it is a world of difference. I think it is wonderful and he is ready to come home. \"    Top Life Stressors:   Anxiety when he is out in public or if he has to talk to people  This behavior started about 2 years ago, out of nowhere    Background History Relevant to Current Hospitalization:  Previous suicide attempts  He was hospitalized about a year for similar concerns    Family Mental Health/Substance Use History:   Dad side mental and substance abuse     Support Network's Goal for Hospitalization:   Get patient stabilized  Bring him back to reality    Discharge Plan:   Return to [facility] Supportive of Discharge Plan:   Yes    Support can confirm Safety of Location and Security of Weapons:   No guns in the home  All other weapons (knives) are locked and secured     Support agreeable to Safeguard and Monitor Medications (including Prescription and OTC):   Yes    Identified Barriers to Compliance with Discharge Plan:   None identified  Recommendations for Support Network:   Please be available for additional questions.  Call [facility] [place] [facility]"" [facility] [place] if you have any

## 2023-05-15 NOTE — PLAN OF CARE
Pt is calm and cooperative with care, pt denies SI,HI,AVH, pt is worried he will get Discharged to soon and he will go back to the way he was, pt is more social than previously in his admission, which is his goal to be out of his room and talk to people. Problem: Self Harm/Suicidality  Goal: Will have no self-injury during hospital stay  Description: INTERVENTIONS:  1. Ensure constant observer at bedside with Q15M safety checks  2. Maintain a safe environment  3. Secure patient belongings  4. Ensure family/visitors adhere to safety recommendations  5. Ensure safety tray has been added to patient's diet order  6. Every shift and PRN: Re-assess suicidal risk via Frequent Screener    Outcome: Progressing  Flowsheets (Taken 5/15/2023 1047)  Will have no self-injury during hospital stay: Maintain a safe environment     Problem: Anxiety  Goal: Will report anxiety at manageable levels  Description: INTERVENTIONS:  1. Administer medication as ordered  2. Teach and rehearse alternative coping skills  3. Provide emotional support with 1:1 interaction with staff  Outcome: Progressing  Flowsheets (Taken 5/15/2023 1047)  Will report anxiety at manageable levels: Provide emotional support with 1:1 interaction with staff     Problem: Coping  Goal: Pt/Family able to verbalize concerns and demonstrate effective coping strategies  Description: INTERVENTIONS:  1. Assist patient/family to identify coping skills, available support systems and cultural and spiritual values  2. Provide emotional support, including active listening and acknowledgement of concerns of patient and caregivers  3. Reduce environmental stimuli, as able  4. Instruct patient/family in relaxation techniques, as appropriate  5.  Assess for spiritual pain/suffering and initiate Spiritual Care, Psychosocial Clinical Specialist consults as needed  Outcome: Progressing  Flowsheets (Taken 5/15/2023 1047)  Patient/family able to verbalize anxieties, fears, and

## 2023-05-15 NOTE — GROUP NOTE
Group Therapy Note    Date: 5/15/2023    Group Start Time: 1300  Group End Time: 4303  Group Topic: Group Documentation    [facility]    Rene Ochoa        Group Therapy Note    Attendees: 9/23       Patient's Goal:  Participate in group talking about positivity and doing a guided meditation. Notes:  Patient actively participated. Patient was quiet, not speaking unless spoken to. Status After Intervention:  Improved    Participation Level:  Active Listener    Participation Quality: Appropriate and Attentive      Speech:  hesitant      Thought Process/Content: Logical      Affective Functioning: Congruent      Mood: depressed      Level of consciousness:  Alert and Attentive      Response to Learning: Progressing to goal      Endings: None Reported    Modes of Intervention: Support      Discipline Responsible: [address], [employer]      Signature:  Rene Ochoa

## 2023-05-15 NOTE — PLAN OF CARE
Patient is out watching tv. Brighter affect. Pt states he accomplished his goal of spending the day out of his room. Pt denies both anxiety and depression. Denies SI/HI and AVH. Problem: Self Harm/Suicidality  Goal: Will have no self-injury during hospital stay  Description: INTERVENTIONS:  1. Ensure constant observer at bedside with Q15M safety checks  2. Maintain a safe environment  3. Secure patient belongings  4. Ensure family/visitors adhere to safety recommendations  5. Ensure safety tray has been added to patient's diet order  6. Every shift and PRN: Re-assess suicidal risk via Frequent Screener    5/14/2023 2037 by Radha Love RN  Outcome: Progressing  5/14/2023 1519 by Dusty Donis RN  Outcome: Progressing  Flowsheets  Taken 5/14/2023 1519  Will have no self-injury during hospital stay: Maintain a safe environment  Taken 5/14/2023 1513  Will have no self-injury during hospital stay: Maintain a safe environment     Problem: Anxiety  Goal: Will report anxiety at manageable levels  Description: INTERVENTIONS:  1. Administer medication as ordered  2. Teach and rehearse alternative coping skills  3. Provide emotional support with 1:1 interaction with staff  5/14/2023 2037 by Radha Love RN  Outcome: Progressing  5/14/2023 1519 by Dusty Donis RN  Outcome: Progressing  Flowsheets (Taken 5/14/2023 1513)  Will report anxiety at manageable levels:   Administer medication as ordered   Provide emotional support with 1:1 interaction with staff   Teach and rehearse alternative coping skills     Problem: Coping  Goal: Pt/Family able to verbalize concerns and demonstrate effective coping strategies  Description: INTERVENTIONS:  1. Assist patient/family to identify coping skills, available support systems and cultural and spiritual values  2. Provide emotional support, including active listening and acknowledgement of concerns of patient and caregivers  3. Reduce environmental stimuli, as able  4.

## 2023-05-16 LAB
ANION GAP SERPL CALCULATED.3IONS-SCNC: 10 MEQ/L (ref 9–15)
BASOPHILS # BLD: 0 K/UL (ref 0–0.2)
BASOPHILS NFR BLD: 0.5 %
BUN SERPL-MCNC: 13 MG/DL (ref 6–20)
CALCIUM SERPL-MCNC: 8.9 MG/DL (ref 8.5–9.9)
CHLORIDE SERPL-SCNC: 109 MEQ/L (ref 95–107)
CO2 SERPL-SCNC: 24 MEQ/L (ref 20–31)
CREAT SERPL-MCNC: 1.07 MG/DL (ref 0.7–1.2)
EOSINOPHIL # BLD: 0.1 K/UL (ref 0–0.7)
EOSINOPHIL NFR BLD: 0.8 %
ERYTHROCYTE [DISTWIDTH] IN BLOOD BY AUTOMATED COUNT: 13.1 % (ref 11.5–14.5)
GLUCOSE SERPL-MCNC: 88 MG/DL (ref 70–99)
HCT VFR BLD AUTO: 45.5 % (ref 42–52)
HGB BLD-MCNC: 15.4 G/DL (ref 14–18)
LYMPHOCYTES # BLD: 2.5 K/UL (ref 1–4.8)
LYMPHOCYTES NFR BLD: 34.6 %
MCH RBC QN AUTO: 29 PG (ref 27–31.3)
MCHC RBC AUTO-ENTMCNC: 33.9 % (ref 33–37)
MCV RBC AUTO: 85.4 FL (ref 79–92.2)
MONOCYTES # BLD: 0.5 K/UL (ref 0.2–0.8)
MONOCYTES NFR BLD: 6.9 %
NEUTROPHILS # BLD: 4.1 K/UL (ref 1.4–6.5)
NEUTS SEG NFR BLD: 57.2 %
PLATELET # BLD AUTO: 100 K/UL (ref 130–400)
PLATELET BLD QL SMEAR: ABNORMAL
POTASSIUM SERPL-SCNC: 4.4 MEQ/L (ref 3.4–4.9)
RBC # BLD AUTO: 5.33 M/UL (ref 4.7–6.1)
SODIUM SERPL-SCNC: 143 MEQ/L (ref 135–144)
WBC # BLD AUTO: 7.2 K/UL (ref 4.8–10.8)

## 2023-05-16 PROCEDURE — 36415 COLL VENOUS BLD VENIPUNCTURE: CPT

## 2023-05-16 PROCEDURE — 1240000000 HC EMOTIONAL WELLNESS R&B

## 2023-05-16 PROCEDURE — 6370000000 HC RX 637 (ALT 250 FOR IP): Performed by: PSYCHIATRY & NEUROLOGY

## 2023-05-16 PROCEDURE — 80048 BASIC METABOLIC PNL TOTAL CA: CPT

## 2023-05-16 RX ADMIN — VORTIOXETINE 10 MG: 10 TABLET, FILM COATED ORAL at 09:29

## 2023-05-16 RX ADMIN — QUETIAPINE FUMARATE 150 MG: 100 TABLET ORAL at 20:56

## 2023-05-16 NOTE — CODE DOCUMENTATION
2145 - 2145 Rapid Response called. Pt was talking with with LPN and had c/o \"not feeling right. \" Pt was pale. Pt was assisted to a chair and was hypotensive. Pt assisted to room via wheelchair. IV was placed and normal saline boluses were started. Pt's BP increased. Pt to receive one more liter of NS and then will be reassessed. Rapid Response ended.      Electronically signed by Mee Servin RN on 5/15/2023 at 10:51 PM

## 2023-05-16 NOTE — GROUP NOTE
Group Therapy Note    Date: 5/16/2023    Group Start Time: 1310  Group End Time: 3543  Group Topic: Group Documentation    [facility]    Sagrario Dougherty        Group Therapy Note    Attendees: 10/18       Patient's Goal:  Participate in group sharing and socializing. Notes:  Patient actively participated. Status After Intervention:  Improved    Participation Level:  Active Listener and Interactive    Participation Quality: Appropriate, Attentive, and Sharing      Speech:  hesitant      Thought Process/Content: Logical      Affective Functioning: Blunted      Mood: depressed      Level of consciousness:  Alert and Attentive      Response to Learning: Progressing to goal      Endings: None Reported    Modes of Intervention: Support      Discipline Responsible: [address], [employer]      Signature:  Sagrario Dougherty

## 2023-05-16 NOTE — GROUP NOTE
Group Therapy Note    Date: 5/15/2023    Group Start Time: 1930  Group End Time: 2000  Group Topic: Wrap-Up    [facility]    Serena Rock RN; Linda Renee LPN; Vasu Evangelista LPN    To attend wrap up group and state whether or not goal was met. Group Therapy Note    Attendees: 19/22       Patient's Goal:  To leave my room. Notes:  Pt reports meeting goal today. Status After Intervention:  Improved    Participation Level:  Active Listener and Interactive    Participation Quality: Appropriate      Speech:  normal      Thought Process/Content: Logical      Affective Functioning: Congruent      Mood: euthymic      Level of consciousness:  Alert and Oriented x4      Response to Learning: Capable of insight      Endings: None Reported    Modes of Intervention: Support      Discipline Responsible: Registered Nurse      Signature:  Serena Rock RN

## 2023-05-16 NOTE — PLAN OF CARE
Pt is calm and cooperative with care. Pt reports he still feels \"woozy\" at times, pt educated on care. Pt denies SI,HI,AVH, pt reports anxiety and depression are  much improved, good eye contact noted, pt is social with staff and peers. Pt reports he has fleeting suicidal thoughts when he is laying in bed and things are quiet but feels he would not act on them. Pt verbalized he is grateful he came to the hospital he is feeling much better. Problem: Self Harm/Suicidality  Goal: Will have no self-injury during hospital stay  Description: INTERVENTIONS:  1. Ensure constant observer at bedside with Q15M safety checks  2. Maintain a safe environment  3. Secure patient belongings  4. Ensure family/visitors adhere to safety recommendations  5. Ensure safety tray has been added to patient's diet order  6. Every shift and PRN: Re-assess suicidal risk via Frequent Screener    5/16/2023 1148 by Los Tovar RN  Outcome: Progressing  Flowsheets (Taken 5/16/2023 1131)  Will have no self-injury during hospital stay: Maintain a safe environment  5/16/2023 0138 by Slava Kirkland RN  Outcome: Progressing     Problem: Anxiety  Goal: Will report anxiety at manageable levels  Description: INTERVENTIONS:  1. Administer medication as ordered  2. Teach and rehearse alternative coping skills  3. Provide emotional support with 1:1 interaction with staff  5/16/2023 1148 by Los Tovar RN  Outcome: Progressing  Flowsheets (Taken 5/16/2023 1131)  Will report anxiety at manageable levels: Provide emotional support with 1:1 interaction with staff  5/16/2023 0138 by Slava Kirkland RN  Outcome: Progressing     Problem: Coping  Goal: Pt/Family able to verbalize concerns and demonstrate effective coping strategies  Description: INTERVENTIONS:  1. Assist patient/family to identify coping skills, available support systems and cultural and spiritual values  2.  Provide emotional support, including active listening

## 2023-05-16 NOTE — GROUP NOTE
Group Therapy Note    Date: 5/16/2023    Group Start Time: 4174  Group End Time: 3945  Group Topic: Healthy Living/Wellness    [facility] KIMBERLI Spears RN        Group Therapy Note    Attendees: 11/17         Patient's Goal:  Review and discuss healthy living and coping skills.      Notes:  alert and appropriate    Status After Intervention:  Unchanged    Participation Level: Interactive    Participation Quality: Appropriate and Attentive      Speech:  normal      Thought Process/Content: Logical  Linear      Affective Functioning: Congruent      Mood: euthymic      Level of consciousness:  Alert and Oriented x4      Response to Learning: Able to verbalize current knowledge/experience and Able to verbalize/acknowledge new learning      Endings: None Reported    Modes of Intervention: Support and Socialization      Discipline Responsible: Registered Nurse      Signature:  Tim Spears RN

## 2023-05-16 NOTE — GROUP NOTE
Group Therapy Note    Date: 5/16/2023    Group Start Time: 1000  Group End Time: 1100  Group Topic: Psychoeducation    [facility]    Micky Lo        Group Therapy Note    Attendees: 12/23       Patient's Goal:  \"Stay out of my room\"    Notes:  Patient attended the 1000 skills group. Patient was mostly quiet and had minimal interaction. He was calm, attentive and he worked well on his task.     Status After Intervention:  Unchanged    Participation Level: Good    Participation Quality: Appropriate      Speech:  mostly quiet      Thought Process/Content: Logical      Affective Functioning: Congruent      Mood:  calm      Level of consciousness:  Alert      Response to Learning: Progressing to goal      Endings: None Reported    Modes of Intervention: Education, Socialization, and Activity      Discipline Responsible: Psychoeducational Specialist      Signature:  Micky Lo

## 2023-05-16 NOTE — SIGNIFICANT EVENT
Rapid response note    Patient was diaphoretic, pale. He was able to lose consciousness when response was called. Patient was sat down and his legs up. His blood pressure was borderline low with systolic in the 41N. He was transferred to his bed. Initially his heart rate was in the 50s and as low as 40s at one point. EKG was done which showed some sinus arrhythmia but otherwise no significant abnormality. Patient was nauseous and dizzy. He was started on 500 cc normal saline bolus. Gradually blood pressure improved and his color and symptoms improved. Additional 1 L bolus given. Labs including CBC, CMP, magnesium, lactic acid, troponin ordered. Patient was feeling better by the end of the rapid response. I rechecked on the patient an hour after. Patient's symptoms completely resolved. Orthostatic was checked and was negative. Mild elevation of creatinine on his CMP but not to the level of DRISS. Most likely due to transient hypotension. CBC is still pending. Troponins negative and no lactic acidosis. Most likely patient had a vasovagal episode versus beta-blocker induced bradycardia and hypotension causing his symptoms. He was just started propranolol earlier today and he received his second dose an hour before his symptoms. We will discontinue propranolol for now. Avoid beta-blocker.       Electronically signed by Manjit Lindo MD on 5/15/2023 at 11:16 PM

## 2023-05-16 NOTE — PLAN OF CARE
Problem: Self Harm/Suicidality  Goal: Will have no self-injury during hospital stay  Description: INTERVENTIONS:  1. Ensure constant observer at bedside with Q15M safety checks  2. Maintain a safe environment  3. Secure patient belongings  4. Ensure family/visitors adhere to safety recommendations  5. Ensure safety tray has been added to patient's diet order  6. Every shift and PRN: Re-assess suicidal risk via Frequent Screener    Outcome: Progressing     Problem: Anxiety  Goal: Will report anxiety at manageable levels  Description: INTERVENTIONS:  1. Administer medication as ordered  2. Teach and rehearse alternative coping skills  3. Provide emotional support with 1:1 interaction with staff  Outcome: Progressing     Problem: Coping  Goal: Pt/Family able to verbalize concerns and demonstrate effective coping strategies  Description: INTERVENTIONS:  1. Assist patient/family to identify coping skills, available support systems and cultural and spiritual values  2. Provide emotional support, including active listening and acknowledgement of concerns of patient and caregivers  3. Reduce environmental stimuli, as able  4. Instruct patient/family in relaxation techniques, as appropriate  5. Assess for spiritual pain/suffering and initiate Spiritual Care, Psychosocial Clinical Specialist consults as needed  Outcome: Progressing     Problem: Depression  Goal: Will be euthymic at discharge  Description: INTERVENTIONS:  1. Administer medication as ordered  2. Provide emotional support via 1:1 interaction with staff  3. Encourage involvement in milieu/groups/activities  4. Monitor for social isolation  Outcome: Progressing     Problem: Psychosis  Goal: Will report no hallucinations or delusions  Description: INTERVENTIONS:  1. Administer medication as  ordered  2. Assist with reality testing to support increasing orientation  3.  Assess if patient's hallucinations or delusions are encouraging self harm or harm to

## 2023-05-17 VITALS
HEART RATE: 72 BPM | TEMPERATURE: 97.7 F | HEIGHT: 66 IN | BODY MASS INDEX: 18.8 KG/M2 | WEIGHT: 117 LBS | OXYGEN SATURATION: 99 % | RESPIRATION RATE: 20 BRPM | DIASTOLIC BLOOD PRESSURE: 92 MMHG | SYSTOLIC BLOOD PRESSURE: 140 MMHG

## 2023-05-17 PROCEDURE — 6370000000 HC RX 637 (ALT 250 FOR IP): Performed by: PSYCHIATRY & NEUROLOGY

## 2023-05-17 RX ORDER — QUETIAPINE FUMARATE 150 MG/1
150 TABLET, FILM COATED ORAL NIGHTLY
Qty: 15 TABLET | Refills: 3 | Status: SHIPPED | OUTPATIENT
Start: 2023-05-17

## 2023-05-17 RX ADMIN — VORTIOXETINE 10 MG: 10 TABLET, FILM COATED ORAL at 08:56

## 2023-05-17 NOTE — PLAN OF CARE
Pt up ad kay , pleasant on approach, interactive with staff and peer. No suicidal ideation voiced, no suicidal gestures observed. Denies A/V/H, depression and anxiety. \"OK\" sleep and appetite. Flat affect, but good eye contact. Patient remains free from harm. Problem: Self Harm/Suicidality  Goal: Will have no self-injury during hospital stay  Description: INTERVENTIONS:  1. Ensure constant observer at bedside with Q15M safety checks  2. Maintain a safe environment  3. Secure patient belongings  4. Ensure family/visitors adhere to safety recommendations  5. Ensure safety tray has been added to patient's diet order  6. Every shift and PRN: Re-assess suicidal risk via Frequent Screener    5/16/2023 2009 by Herminio Martinez RN  Outcome: Progressing     Problem: Anxiety  Goal: Will report anxiety at manageable levels  Description: INTERVENTIONS:  1. Administer medication as ordered  2. Teach and rehearse alternative coping skills  3. Provide emotional support with 1:1 interaction with staff  5/16/2023 2009 by Herminio Martinez RN  Outcome: Progressing     Problem: Coping  Goal: Pt/Family able to verbalize concerns and demonstrate effective coping strategies  Description: INTERVENTIONS:  1. Assist patient/family to identify coping skills, available support systems and cultural and spiritual values  2. Provide emotional support, including active listening and acknowledgement of concerns of patient and caregivers  3. Reduce environmental stimuli, as able  4. Instruct patient/family in relaxation techniques, as appropriate  5. Assess for spiritual pain/suffering and initiate Spiritual Care, Psychosocial Clinical Specialist consults as needed  5/16/2023 2009 by Herminio Martinez RN  Outcome: Progressing     Problem: Depression  Goal: Will be euthymic at discharge  Description: INTERVENTIONS:  1. Administer medication as ordered  2. Provide emotional support via 1:1 interaction with staff  3.  Encourage involvement in

## 2023-05-17 NOTE — GROUP NOTE
Group Therapy Note    Date: 5/17/2023    Group Start Time: 1005  Group End Time: 1100  Group Topic: Psychoeducation    [facility]    Matilda Granados        Group Therapy Note    Attendees: 10/18       Patient's Goal:  \"To go home\"     Notes:  Patient attended the 1000 skills group. Patient was more animated, he enjoyed socializing with his peers and overall participation was good. Status After Intervention:  Improved    Participation Level:  Active Listener    Participation Quality: Appropriate      Speech:  normal      Thought Process/Content: Logical      Affective Functioning: Congruent      Mood:  calm      Level of consciousness:  Alert and Attentive      Response to Learning: Able to retain information      Endings: None Reported    Modes of Intervention: Education, Socialization, and Activity      Discipline Responsible: Psychoeducational Specialist      Signature:  Matilda Granados

## 2023-05-17 NOTE — PROGRESS NOTES
CLINICAL PHARMACY NOTE: MEDS TO BEDS    Total # of Prescriptions Filled: 2   The following medications were delivered to the patient:  Quetiapine 150 mg tab   Trintellix 10mg tab    Additional Documentation:
Frank Huddleston [facility]. FOLLOW-UP NOTE       5/15/2023     Patient was seen and examined in person, Chart reviewed   Patient's case discussed with staff/team    Chief Complaint: Depression SI    Interim History:     Reports mood improvement, minimal depression and anxiety  Continues to report poor motivation and energy, though improving   Increased ADLs  Fair sleep  Adequate appetite, still endorses nausea   Denies SI HI AVH; no delusions noted  Denies intrusive thoughts, nightmares  Med compliant  Flat, constricted affect  Continues to endorse social anxiety; improving     Appetite:   [] Normal/Unchanged  [] Increased  [x] Decreased      Sleep:       [] Normal/Unchanged  [x] Fair       [] Poor              Energy:    [] Normal/Unchanged  [x] Increased  [] Decreased        SI [] Present  [x] Absent    HI  []Present  [x] Absent     Aggression:  [] yes  [x] no    Patient is [x] able  [] unable to CONTRACT FOR SAFETY     PAST MEDICAL/PSYCHIATRIC HISTORY:   History reviewed. No pertinent past medical history. FAMILY/SOCIAL HISTORY:  History reviewed. No pertinent family history.   Social History     Socioeconomic History    Marital status: Single     Spouse name: Not on file    Number of children: Not on file    Years of education: Not on file    Highest education level: Not on file   Occupational History    Not on file   Tobacco Use    Smoking status: Former     Types: Cigarettes    Smokeless tobacco: Never   Vaping Use    Vaping Use: Never used   Substance and Sexual Activity    Alcohol use: Never     Comment: social    Drug use: Not Currently     Types: Marijuana Allen Au    Sexual activity: Not on file   Other Topics Concern    Not on file   Social History Narrative    Not on file     Social Determinants of Health     Financial Resource Strain: Not on file   Food Insecurity: Not on file   Transportation Needs: Not on file   Physical Activity: Not on file   Stress: Not on file   Social
Mor[address] attended the morning community meeting on 5/15/23. Topics discussed today     [x] Introduction  Day of the week and date  Mask distribution  Current mask requirements  [x]Teams  Explanation of  Green and Blue team criteria  Nurses assigned to each team for today  Explanation about green and blue paper  Date  Patient's Name  Patient's Nurse  Goals  [x] Visitation  Announce the visiting hours for the day  Announce which team is allowed to have visitors for the day  Review any updated Covid 19 requirements for visitors during visitation  Vaccine Card or negative Covid test within 48 hours of visit  State Identification  Patients are reminded to alert the  at least 1 hour before visitation   [x] Unit Orientation  Coffee use  Phone location and etiquette  Shower locations  [place] and dryer location and process  Common area expectations  Staff rounds expectation  [x] Meals   Educate patient to the menu  The patient is encouraged to fill out the menu to get preferences at mealtime  The patient is educated that if they do not fill out the menu, they will get the standard tray  The coffee pot is decaf, patient encouraged to order regular coffee from menu.   Educate patient to the meal process  Patient encouraged to eat snacks provided twice daily  Snacks may stay in patient room     [x] Discharge Process  Discharge expectations  Fill out the survey after discharge   [x] Hygiene  Daily showers encouraged  Showers availability discussed   Daily dressing encouraged  Discussed wearing street clothing  Education provided on where to place linens and clothing  Linens in the hamper  personal clothing does not go into the linen hamper  [x] Group   Patient encouraged to attend group provided  Time of Group Meetings discussed  Gentle reminder that attendance is a Physician order  [x] Movement  Chair exercises completed  Stretching completed  Notes:  GOAL : \" to stay
[address] attended the morning community meeting on 5/16/23. Topics discussed today     [x] Introduction  Day of the week and date  Mask distribution  Current mask requirements  [x]Teams  Explanation of  Green and Blue team criteria  Nurses assigned to each team for today  Explanation about green and blue paper  Date  Patient's Name  Patient's Nurse  Goals  [x] Visitation  Announce the visiting hours for the day  Announce which team is allowed to have visitors for the day  Review any updated Covid 19 requirements for visitors during visitation  Vaccine Card or negative Covid test within 48 hours of visit  State Identification  Patients are reminded to alert the  at least 1 hour before visitation   [x] Unit Orientation  Coffee use  Phone location and etiquette  Shower locations  [place] and dryer location and process  Common area expectations  Staff rounds expectation  [x] Meals   Educate patient to the menu  The patient is encouraged to fill out the menu to get preferences at mealtime  The patient is educated that if they do not fill out the menu, they will get the standard tray  The coffee pot is decaf, patient encouraged to order regular coffee from menu.   Educate patient to the meal process  Patient encouraged to eat snacks provided twice daily  Snacks may stay in patient room     [x] Discharge Process  Discharge expectations  Fill out the survey after discharge   [x] Hygiene  Daily showers encouraged  Showers availability discussed   Daily dressing encouraged  Discussed wearing street clothing  Education provided on where to place linens and clothing  Linens in the hamper  personal clothing does not go into the linen hamper  [x] Group   Patient encouraged to attend group provided  Time of Group Meetings discussed  Gentle reminder that attendance is a Physician order  [x] Movement  Chair exercises completed  Stretching completed  Notes: Goal - \"Stay out of
[address] attended the morning community meeting on 5/17/23. Topics discussed today     [x] Introduction  Day of the week and date  Mask distribution  Current mask requirements  [x]Teams  Explanation of  Green and Blue team criteria  Nurses assigned to each team for today  Explanation about green and blue paper  Date  Patient's Name  Patient's Nurse  Goals  [x] Visitation  Announce the visiting hours for the day  Announce which team is allowed to have visitors for the day  Review any updated Covid 19 requirements for visitors during visitation  Vaccine Card or negative Covid test within 48 hours of visit  State Identification  Patients are reminded to alert the  at least 1 hour before visitation   [x] Unit Orientation  Coffee use  Phone location and etiquette  Shower locations  [place] and dryer location and process  Common area expectations  Staff rounds expectation  [x] Meals   Educate patient to the menu  The patient is encouraged to fill out the menu to get preferences at mealtime  The patient is educated that if they do not fill out the menu, they will get the standard tray  The coffee pot is decaf, patient encouraged to order regular coffee from menu.   Educate patient to the meal process  Patient encouraged to eat snacks provided twice daily  Snacks may stay in patient room     [x] Discharge Process  Discharge expectations  Fill out the survey after discharge   [x] Hygiene  Daily showers encouraged  Showers availability discussed   Daily dressing encouraged  Discussed wearing street clothing  Education provided on where to place linens and clothing  Linens in the hamper  personal clothing does not go into the linen hamper  [x] Group   Patient encouraged to attend group provided  Time of Group Meetings discussed  Gentle reminder that attendance is a Physician order  [x] Movement  Chair exercises completed  Stretching completed  Notes:  Goal - \"To go home\"
Patient attended and participated in wrap-up group
Patient did not attend group despite staff encouragement.
Patient has been calm and cooperative with staff He rates anxiety 3 and depression 3 on a 10 point scale where 10 is the worst.  Patient does not want to go home. He is wearing a mask and is noted to stay to himself a lot. Patient maintains good eye contact with writer during interview and assessment. He endorses good appetite and sleep. He denies SI, Hi, and hallucinations at this time. Patient denies current needs.
Violence: Not on file   Housing Stability: Not on file           ROS:  [x] All negative/unchanged except if checked.  Explain positive(checked items) below:  [] Constitutional  [] Eyes  [] Ear/Nose/Mouth/Throat  [] Respiratory  [] CV  [] GI  []   [] Musculoskeletal  [] Skin/Breast  [] Neurological  [] Endocrine  [] Heme/Lymph  [] Allergic/Immunologic    Explanation:     MEDICATIONS:    Current Facility-Administered Medications:     QUEtiapine (SEROQUEL) tablet 200 mg, 200 mg, Oral, Nightly, Carmine Manzanicarolinaa, APRN - CNP    propranolol (INDERAL) tablet 10 mg, 10 mg, Oral, BID, Carmine Manzanicarolinaa, APRN - CNP    ondansetron (ZOFRAN-ODT) disintegrating tablet 4 mg, 4 mg, Oral, Q8H PRN, Carmine Steen APRN - CNP, 4 mg at 05/15/23 0841    VORTIoxetine (TRINTELLIX) tablet 10 mg, 10 mg, Oral, Daily, Anabela Schmitt MD, 10 mg at 05/15/23 0841    acetaminophen (TYLENOL) tablet 650 mg, 650 mg, Oral, Q4H PRN, Anabela Schmitt MD    magnesium hydroxide (MILK OF MAGNESIA) 400 MG/5ML suspension 30 mL, 30 mL, Oral, Daily PRN, Anabela Schmitt MD    aluminum & magnesium hydroxide-simethicone (MAALOX) 200-200-20 MG/5ML suspension 30 mL, 30 mL, Oral, PRN, Anabela Schmitt MD    haloperidol (HALDOL) tablet 5 mg, 5 mg, Oral, Q6H PRN **OR** haloperidol lactate (HALDOL) injection 5 mg, 5 mg, IntraMUSCular, Q6H PRN, Anabela Schmitt MD    benztropine mesylate (COGENTIN) injection 2 mg, 2 mg, IntraMUSCular, BID PRN, Anabela Schmitt MD    traZODone (DESYREL) tablet 50 mg, 50 mg, Oral, Nightly PRN, Anabela Schmitt MD    hydrOXYzine pamoate (VISTARIL) capsule 50 mg, 50 mg, Oral, Q6H PRN **OR** hydrOXYzine (VISTARIL) injection 50 mg, 50 mg, IntraMUSCular, Q6H PRN, Anabela Schmitt MD      Examination:  BP (!) 126/92   Pulse 76   Temp 97.7 °F (36.5 °C)   Resp 18   Ht 5' 6\" (1.676 m)   Wt 117 lb (53.1 kg)   SpO2 99%   BMI 18.88 kg/m²   Gait - steady  Medication side effects(SE): nausea     Mental Status Examination:    Level of

## 2023-05-17 NOTE — FLOWSHEET NOTE
Reviewed discharge instructions with patient. Pt. Verbalized understanding. Denies SI/HI/AVH. Awaiting cab.

## 2023-05-17 NOTE — GROUP NOTE
Group Therapy Note    Date: 5/16/2023    Group Start Time: 2000  Group End Time: 2030  Group Topic: Wrap-Up    [facility]    Riya Messer RN    To participate in wrap up group and state whether or not goal was met. Group Therapy Note    Attendees: 16/18       Patient's Goal:  To stay out of my room. Notes:  Pt states he met goal and was out of his room all day. Pt reports that he made friends today also.      Status After Intervention:  Improved    Participation Level: Interactive    Participation Quality: Appropriate, Attentive, and Sharing      Speech:  normal      Thought Process/Content: Logical      Affective Functioning: Congruent      Mood: euthymic      Level of consciousness:  Alert and Oriented x4      Response to Learning: Capable of insight      Endings: None Reported    Modes of Intervention: Support      Discipline Responsible: Registered Nurse      Signature:  Riya Messer RN

## 2023-05-17 NOTE — DISCHARGE INSTRUCTIONS
Keep all follow up appointments, take medications as ordered, utilize positive supports, abstain from use of alcohol and drugs. If symptoms return or you feel at risk to yourself or others, please call 911, return the nearest emergency room, or call your local crisis hotline:  [facility]: [phone]) [phone]: 1(108) [address]: [phone]     Due to the [address] [facility] is not currently available. For assistance with quitting smoking please go to https://smokefree.gov. A prescription for an FDA-approved tobacco cessation medication was offered at discharge and the patient refused. Someone from [address] [place] [address] will be calling you tomorrow to follow up on your care. If you don't hear from us, give us a call! [phone].

## 2023-05-17 NOTE — DISCHARGE INSTR - DIET

## 2023-05-17 NOTE — DISCHARGE SUMMARY
DISCHARGE SUMMARY      Patient ID:  Barbra Hutchins  [id number]  82 y.o.  2000      Admit date: 5/9/2023    Discharge date and time: 5/17/2023    Admitting Physician: Aiyana Barriga MD     Discharge Physician: Dr Steve Slade MD    Admission Diagnoses: Suicidal ideation [R45.851]  Psychosis, unspecified psychosis type ([address] [F29]    Admission Condition: poor    Discharged Condition: stable    Admission Circumstance:     Patient presents to the ED for a psychiatric evaluation with his mother after he left their home and began walking to the [facility] in an attempt to kill himself by jumping. His mother states he has been depressed, isolative and acting bizarre at home. She reports he has set up living areas in the closet and bathroom in the home. Mother also states he has not been sleeping or eating. Per Mother, there have been 3-4 medication changes by his outpatient provider since his discharge from  in April 2022. Patient reports he has been medication compliant. Patiet guarded and hesitant to answer questions. Thought process organized and able to answer questions appropriately. Mood depressed, anhedonic and anxious with intense eye contact and blunt affect. States \"I just don't want to be here any more. I am a complete failure to everyone in my life and I can't even help my family. All I do is lay in bed all the time and can't even leave the house. \" Reports severe panic attacks daily. Patient complaint of racing, intrusive thoughts that prevent him from sleeping nightly. Reports sleeping an average of 2-3 hours nightly. Denies HI. Admits to recent A/V hallucinations. Patient describes hearing a voice that speaks \"negative things\" to him. Denies command in nature.  Patient also describes seeing \"black figures\" with last times occurring two days ago        HISTORY OF PRESENT ILLNESS:       The patient is a 23 y.o. male single live with parents unemployed with significant past history MDD, not on any

## 2023-05-17 NOTE — GROUP NOTE
Group Therapy Note    Date: 5/17/2023    Group Start Time: 1400  Group End Time: 1500  Group Topic: Recreational    [facility]    Trung Falk RN        Group Therapy Note    Attendees: 9/18           Notes:  Recreation for mood management    Status After Intervention:  Improved    Participation Level: Interactive    Participation Quality: Appropriate      Speech:  normal      Thought Process/Content: Logical      Affective Functioning: Congruent      Mood: euthymic      Level of consciousness:  Alert and Oriented x4      Response to Learning: Able to verbalize current knowledge/experience      Endings: None Reported    Modes of Intervention: Education      Discipline Responsible: Registered Nurse      Signature:  Trung Falk RN

## 2023-05-19 LAB
EKG ATRIAL RATE: 72 BPM
EKG P AXIS: 63 DEGREES
EKG P-R INTERVAL: 126 MS
EKG Q-T INTERVAL: 418 MS
EKG QRS DURATION: 94 MS
EKG QTC CALCULATION (BAZETT): 457 MS
EKG R AXIS: 88 DEGREES
EKG T AXIS: 60 DEGREES
EKG VENTRICULAR RATE: 72 BPM

## 2024-05-08 NOTE — PROGRESS NOTES
05/08/24    Jas MELISSA Betts  [address] [place] [id number]    You have been scheduled at [facility] for a Transesophageal Echocardiogram on 5/16/2024 at 8:30 AM with Dr. Andrade.    Please arrive at 7:00 AM.    Please register at the Main Entrance/Information Desk of [facility]. From there you will go to the Cath Lab where the procedure is done. The procedure itself does not take long, but you will be in Ambulatory Services until you are awake enough to go home. Please plan on being here for a total of 3-4 hours.    PATIENT INSTRUCTIONS:    NO FOOD OR DRINK AFTER MIDNIGHT PRIOR TO YOUR PROCEDURE (small sips of water okay for dry mouth).    TAKE MORNING MEDICATIONS WITH A SMALL SLIP OF WATER.    PLEASE BRING ALL YOUR MEDICATIONS WITH YOU TO THE PROCEDURE.  YOU WILL NEED A  TO TAKE YOU HOME.    You will need to have labs drawn (BMP/Mag/PT-INR if taking Coumadin, Warfarin or Jantoven) prior to the procedure.        If you test positive for COVID, please notify your physician prior to your procedure who will consider rescheduling or delaying when able.  If you start to experience any of the symptoms listed below, prior to your procedure, you are required to call the physician’s clinic to determine if procedure will still occur or be completed.  Temperature:  Fever > 100.0  Respiratory Symptoms:  new or worsening cough, shortness of breath, sore throat  GI Symptoms:  New onset of nausea, vomiting or diarrhea  New onset of loss of taste or smell    If you have any questions or concerns prior to the procedure, please contact us at [phone].     Sincerely,     Dr. Andrade's Office  [facility]  [address] [place] [address]  Phone: [phone]                 Frank Huddleston [facility]. FOLLOW-UP NOTE       3/31/2022     Patient was seen and examined in person, Chart reviewed   Patient's case discussed with staff/team    Chief Complaint: Depression    Interim History:     Pt still feel depressed  Less anxious, slept better  Tolerating medication  Ruminating about his stress  Seen interacting with peers  Appetite:   [] Normal/Unchanged  [] Increased  [x] Decreased      Sleep:       [] Normal/Unchanged  [x] Fair       [] Poor              Energy:    [] Normal/Unchanged  [] Increased  [x] Decreased        SI [x] Present  [] Absent    HI  []Present  [x] Absent     Aggression:  [] yes  [x] no    Patient is [x] able  [] unable to CONTRACT FOR SAFETY     PAST MEDICAL/PSYCHIATRIC HISTORY:   History reviewed. No pertinent past medical history. FAMILY/SOCIAL HISTORY:  History reviewed. No pertinent family history. Social History     Socioeconomic History    Marital status: Single     Spouse name: Not on file    Number of children: Not on file    Years of education: Not on file    Highest education level: Not on file   Occupational History    Not on file   Tobacco Use    Smoking status: Current Every Day Smoker     Types: Cigarettes    Smokeless tobacco: Never Used   Vaping Use    Vaping Use: Never used   Substance and Sexual Activity    Alcohol use: Not on file     Comment: social    Drug use: Yes     Types: Marijuana [address])    Sexual activity: Not on file   Other Topics Concern    Not on file   Social History Narrative    Not on file     Social Determinants of Health     Financial Resource Strain:     Difficulty of Paying Living Expenses: Not on file   Food Insecurity:     Worried About Running Out of Food in the Last Year: Not on file    Adrian of Food in the Last Year: Not on file   Transportation Needs:     Lack of Transportation (Medical): Not on file    Lack of Transportation (Non-Medical):  Not on file   Physical Activity:     Days of Exercise per Week: Not on file    Minutes of Exercise per Session: Not on file   Stress:     Feeling of Stress : Not on file   Social Connections:     Frequency of Communication with Friends and Family: Not on file    Frequency of Social Gatherings with Friends and Family: Not on file    Attends Buddhism Services: Not on file    Active Member of [address] [place] [address] [employer] or Organizations: Not on file    Attends Club or Organization Meetings: Not on file    Marital Status: Not on file   Intimate Partner Violence:     Fear of Current or Ex-Partner: Not on file    Emotionally Abused: Not on file    Physically Abused: Not on file    Sexually Abused: Not on file   Housing Stability:     Unable to Pay for Housing in the Last Year: Not on file    Number of [place] in the Last Year: Not on file    Unstable Housing in the Last Year: Not on file           ROS:  [x] All negative/unchanged except if checked.  Explain positive(checked items) below:  [] Constitutional  [] Eyes  [] Ear/Nose/Mouth/Throat  [] Respiratory  [] CV  [] GI  []   [] Musculoskeletal  [] Skin/Breast  [] Neurological  [] Endocrine  [] Heme/Lymph  [] Allergic/Immunologic    Explanation:     MEDICATIONS:    Current Facility-Administered Medications:     lisinopril (PRINIVIL;ZESTRIL) tablet 5 mg, 5 mg, Oral, Daily, MANUEL Heath NP, 5 mg at 03/31/22 1498    mirtazapine (REMERON) tablet 15 mg, 15 mg, Oral, Nightly, Abrahan Greenwood MD, 15 mg at 03/30/22 2106    acetaminophen (TYLENOL) tablet 650 mg, 650 mg, Oral, Q4H PRN, Grecia Marroquin MD    magnesium hydroxide (MILK OF MAGNESIA) 400 MG/5ML suspension 30 mL, 30 mL, Oral, Daily PRN, Grecia Marroquin MD    aluminum & magnesium hydroxide-simethicone (MAALOX) 200-200-20 MG/5ML suspension 30 mL, 30 mL, Oral, PRN, Grecia Marroquin MD    haloperidol (HALDOL) tablet 5 mg, 5 mg, Oral, Q6H PRN **OR** haloperidol lactate (HALDOL) injection 5 mg, 5 mg, IntraMUSCular, Q6H PRN, Mariela Elizondo MD [place]    benztropine mesylate (COGENTIN) injection 2 mg, 2 mg, IntraMUSCular, BID PRN, Gregg Katz MD    traZODone (DESYREL) tablet 50 mg, 50 mg, Oral, Nightly PRN, Gregg Katz MD, 50 mg at 03/30/22 2106    hydrOXYzine (VISTARIL) injection 50 mg, 50 mg, IntraMUSCular, Q6H PRN **OR** hydrOXYzine (VISTARIL) capsule 50 mg, 50 mg, Oral, Q6H PRN, rGegg Katz MD, 50 mg at 03/30/22 0048      Examination:  /87   Pulse 94   Temp 97.8 °F (36.6 °C) (Oral)   Resp 20   Ht 5' 6\" (1.676 m)   Wt 130 lb (59 kg)   SpO2 99%   BMI 20.98 kg/m²   Gait - steady  Medication side effects(SE): no    Mental Status Examination:    Level of consciousness:  within normal limits   Appearance:  street clothes  Behavior/Motor:  psychomotor retardation  Attitude toward examiner:  cooperative  Speech:  slow   Mood: constricted, decreased range and depressed  Affect:  mood congruent  Thought processes:  coherent   Thought content:  Suicidal Ideation:  passive  Cognition:  oriented to person, place, and time   Concentration intact  Memory intact  Insight poor   Judgement fair   Fund of Knowledge adequate    Mini Mental Status 30/30      DIAGNOSIS:     MDD recurrent severe  KRISTOFER          LABS:    Recent Labs     03/29/22  1745   WBC 4.9   HGB 16.0   *     Recent Labs     03/29/22  1745      K 3.9      CO2 23   BUN 12   CREATININE 1.03   GLUCOSE 122*     Recent Labs     03/29/22  1745   BILITOT 0.4   ALKPHOS 75   AST 16   ALT 15     Lab Results   Component Value Date    LABAMPH Neg 03/29/2022    BARBSCNU Neg 03/29/2022    LABBENZ Neg 03/29/2022    LABMETH Neg 03/29/2022    OPIATESCREENURINE Neg 03/29/2022    PHENCYCLIDINESCREENURINE Neg 03/29/2022    ETOH <10 03/29/2022     Lab Results   Component Value Date    TSH 2.120 03/29/2022     No results found for: LITHIUM  Lab Results   Component Value Date    VALPROATE <2.8 (L) 03/29/2022       RISK ASSESSMENT: high    Treatment Plan:  Reviewed current Medications with the patient. meds as ordered  Risks, benefits, side effects, drug-to-drug interactions and alternatives to treatment were discussed. Collateral information:   CD evaluation  Encourage patient to attend group and other milieu activities.   Discharge planning discussed with the patient and treatment team.    PSYCHOTHERAPY/COUNSELING:  [x] Therapeutic interview  [x] Supportive  [] CBT  [] Ongoing  [] Other    [x] Patient continues to need, on a daily basis, active treatment furnished directly by or requiring the supervision of inpatient psychiatric personnel      Anticipated Length of stay:            Electronically signed by Bernabe Montero MD on 3/31/2022 at 2:57 PM